# Patient Record
Sex: MALE | Race: WHITE | NOT HISPANIC OR LATINO | Employment: OTHER | URBAN - NONMETROPOLITAN AREA
[De-identification: names, ages, dates, MRNs, and addresses within clinical notes are randomized per-mention and may not be internally consistent; named-entity substitution may affect disease eponyms.]

---

## 2017-03-06 ENCOUNTER — TRANSCRIBE ORDERS (OUTPATIENT)
Dept: LAB | Facility: HOSPITAL | Age: 72
End: 2017-03-06

## 2017-03-06 DIAGNOSIS — N18.30 CHRONIC KIDNEY DISEASE, STAGE III (MODERATE) (HCC): Primary | ICD-10-CM

## 2017-03-10 ENCOUNTER — OFFICE VISIT (OUTPATIENT)
Dept: CARDIOLOGY | Facility: CLINIC | Age: 72
End: 2017-03-10

## 2017-03-10 VITALS
HEIGHT: 70 IN | HEART RATE: 73 BPM | SYSTOLIC BLOOD PRESSURE: 107 MMHG | DIASTOLIC BLOOD PRESSURE: 65 MMHG | BODY MASS INDEX: 29.2 KG/M2 | WEIGHT: 204 LBS | OXYGEN SATURATION: 99 %

## 2017-03-10 DIAGNOSIS — I25.10 CORONARY ARTERY DISEASE INVOLVING NATIVE CORONARY ARTERY OF NATIVE HEART WITHOUT ANGINA PECTORIS: Primary | ICD-10-CM

## 2017-03-10 DIAGNOSIS — I10 ESSENTIAL HYPERTENSION: ICD-10-CM

## 2017-03-10 DIAGNOSIS — E78.5 HYPERLIPIDEMIA, UNSPECIFIED HYPERLIPIDEMIA TYPE: ICD-10-CM

## 2017-03-10 PROCEDURE — 99213 OFFICE O/P EST LOW 20 MIN: CPT | Performed by: INTERNAL MEDICINE

## 2017-03-10 RX ORDER — ISOSORBIDE MONONITRATE 60 MG/1
30 TABLET, EXTENDED RELEASE ORAL DAILY
Refills: 0 | COMMUNITY
Start: 2016-12-29

## 2017-03-10 NOTE — PROGRESS NOTES
Chief complaint : Coronary artery disease    History of Present Illness this is a very pleasant 71-year-old gentleman who comes today for a follow-up visit.  Patient has been complaining of chest discomfort along the anterior axillary line.  This pain is not exacerbated with physical exertion or mental stress.  He denies shortness of breath orthopnea or PND.    Subjective      Review of Systems   Constitution: Negative. Negative for decreased appetite, diaphoresis, weakness, night sweats, weight gain and weight loss.   HENT: Negative for headaches, hearing loss, nosebleeds and sore throat.    Eyes: Negative.  Negative for blurred vision and photophobia.   Cardiovascular: Positive for chest pain and dyspnea on exertion. Negative for claudication, irregular heartbeat, leg swelling, palpitations, paroxysmal nocturnal dyspnea and syncope.   Respiratory: Positive for shortness of breath. Negative for cough, hemoptysis and wheezing.    Endocrine: Negative for cold intolerance, heat intolerance, polydipsia, polyphagia and polyuria.   Hematologic/Lymphatic: Negative.    Skin: Negative for color change, dry skin, flushing, itching and rash.   Musculoskeletal: Negative.  Negative for muscle cramps, muscle weakness and myalgias.   Gastrointestinal: Negative for abdominal pain, change in bowel habit, diarrhea, hematemesis, melena, nausea and vomiting.   Genitourinary: Negative for dysuria, frequency and hematuria.   Neurological: Negative for dizziness, focal weakness, light-headedness, loss of balance, numbness and seizures.   Psychiatric/Behavioral: Negative.  Negative for substance abuse, suicidal ideas and thoughts of violence.   Allergic/Immunologic: Negative.        Past Medical History   Diagnosis Date   • Abnormal results of cardiovascular function studies    • Anemia      Anemia - History of   • Benign prostatic hyperplasia    • Chest pain    • Chronic kidney disease, stage 3      Chronic kidney disease stage 3   •  Coronary arteriosclerosis    • Dyslipidemia    • Edema      Edema - Chronic, in feet   • Erectile dysfunction      Erectile dysfunction - History of, SP penile implant in 1994, effective   • Essential hypertension    • Gastroesophageal reflux disease    • History of echocardiogram 08/04/2009     Echocardiogram W/O color flow 70815 (1) - LV, normal size. EF 55-60%. Left atrium, right atrium & right ventricle, normal. Cardiac valves unremarkable. Intracardiac thrombus or pericardial effusion no present. Essentially unremarkable echo for his age.   • History of myocardial infarction 2008   • Hyperlipidemia    • Pain in upper limb      Pain in upper limb - History of right shoulder and arm pain, aching   • Plantar fasciitis    • Type 2 diabetes mellitus with diabetic neuropathy        Family History   Problem Relation Age of Onset   • Cancer Other    • Diabetes Other    • Heart disease Other    • Hypertension Other    • Stroke Other        Codeine     reports that he has never smoked. He does not have any smokeless tobacco history on file. He reports that he drinks alcohol. He reports that he does not use illicit drugs.    Objective     Vital Signs  Heart Rate:  [73] 73  BP: (107)/(65) 107/65    Physical Exam   Constitutional: He is oriented to person, place, and time. He appears well-developed and well-nourished.   HENT:   Head: Normocephalic and atraumatic.   Eyes: Conjunctivae and EOM are normal. Pupils are equal, round, and reactive to light.   Neck: Neck supple. No JVD present. Carotid bruit is not present. No tracheal deviation and no edema present.   Cardiovascular: Normal rate, regular rhythm, S1 normal, S2 normal, normal heart sounds and intact distal pulses.  Exam reveals no gallop, no S3, no S4 and no friction rub.    No murmur heard.  Pulmonary/Chest: Effort normal and breath sounds normal. He has no wheezes. He has no rales. He exhibits no tenderness.   Abdominal: Bowel sounds are normal. He exhibits no  abdominal bruit and no pulsatile midline mass. There is no rebound and no guarding.   Musculoskeletal: Normal range of motion. He exhibits no edema.   Neurological: He is alert and oriented to person, place, and time.   Skin: Skin is warm and dry.   Psychiatric: He has a normal mood and affect.       Procedures    Assessment/Plan     Patient Active Problem List   Diagnosis   • Hyperlipidemia   • Essential hypertension   • Coronary artery disease involving native coronary artery of native heart without angina pectoris     1. Coronary artery disease involving native coronary artery of native heart without angina pectoris  July 8, 2016 patient had cardiac catheterization which revealed patent stent to the left circumflex, patent LIMA to the LAD, patent SVG graft to the PDA  March 2009 patient underwent coronary artery bypass graft surgery with a LIMA to the LAD, SVG to D1, SVG to OM 2, SVG to RCA  August 2010 3.0 x 18, 2.5 x 28 mm, 2.25 x 12 mm xience V stent was placed to the mid and distal left circumflex.  Plan:  · We'll continue with guideline direct medical therapy and risk factor modification.    2. Essential hypertension  Patient's blood pressure is well-controlled.    Plan:  · We'll continue with current medications.    3. Hyperlipidemia, unspecified hyperlipidemia type  Patient currently is on simvastatin.  Plan:  · We'll obtain a fasting lipid panel and liver function test.    I discussed the patients findings and my recommendations with patient.    Lobito Smallwood MD  03/10/17  9:40 AM    EMR Dragon/Transcription disclaimer:   Much of this encounter note is an electronic transcription/translation of spoken language to printed text. The electronic translation of spoken language may permit erroneous, or at times, nonsensical words or phrases to be inadvertently transcribed; Although I have reviewed the note for such errors, some may still exist.

## 2017-12-12 ENCOUNTER — TRANSCRIBE ORDERS (OUTPATIENT)
Dept: LAB | Facility: HOSPITAL | Age: 72
End: 2017-12-12

## 2017-12-12 DIAGNOSIS — N18.30 CHRONIC RENAL DISEASE, STAGE III (HCC): Primary | ICD-10-CM

## 2017-12-15 ENCOUNTER — LAB (OUTPATIENT)
Dept: LAB | Facility: HOSPITAL | Age: 72
End: 2017-12-15

## 2017-12-15 DIAGNOSIS — N18.30 CHRONIC RENAL DISEASE, STAGE III (HCC): ICD-10-CM

## 2017-12-15 LAB
ALBUMIN SERPL-MCNC: 3.9 G/DL (ref 3.4–4.8)
ANION GAP SERPL CALCULATED.3IONS-SCNC: 15 MMOL/L (ref 5–15)
BUN BLD-MCNC: 20 MG/DL (ref 7–21)
BUN/CREAT SERPL: 11.8 (ref 7–25)
CALCIUM SPEC-SCNC: 9.4 MG/DL (ref 8.4–10.2)
CHLORIDE SERPL-SCNC: 105 MMOL/L (ref 95–110)
CO2 SERPL-SCNC: 22 MMOL/L (ref 22–31)
CREAT BLD-MCNC: 1.69 MG/DL (ref 0.7–1.3)
CREAT UR-MCNC: 48.4 MG/DL
GFR SERPL CREATININE-BSD FRML MDRD: 40 ML/MIN/1.73 (ref 60–98)
GLUCOSE BLD-MCNC: 68 MG/DL (ref 60–100)
PHOSPHATE SERPL-MCNC: 3.4 MG/DL (ref 2.4–4.4)
POTASSIUM BLD-SCNC: 3.9 MMOL/L (ref 3.5–5.1)
PROT UR-MCNC: 12.7 MG/DL
PROT/CREAT UR: 262.4 MG/G CREA (ref 0–200)
SODIUM BLD-SCNC: 142 MMOL/L (ref 137–145)

## 2017-12-15 PROCEDURE — 84156 ASSAY OF PROTEIN URINE: CPT

## 2017-12-15 PROCEDURE — 80069 RENAL FUNCTION PANEL: CPT

## 2017-12-15 PROCEDURE — 36415 COLL VENOUS BLD VENIPUNCTURE: CPT

## 2017-12-15 PROCEDURE — 82570 ASSAY OF URINE CREATININE: CPT

## 2018-03-27 ENCOUNTER — OFFICE VISIT (OUTPATIENT)
Dept: CARDIOLOGY | Facility: CLINIC | Age: 73
End: 2018-03-27

## 2018-03-27 VITALS
WEIGHT: 204 LBS | OXYGEN SATURATION: 98 % | HEART RATE: 74 BPM | HEIGHT: 69 IN | BODY MASS INDEX: 30.21 KG/M2 | SYSTOLIC BLOOD PRESSURE: 118 MMHG | DIASTOLIC BLOOD PRESSURE: 68 MMHG

## 2018-03-27 DIAGNOSIS — I10 ESSENTIAL HYPERTENSION: Primary | ICD-10-CM

## 2018-03-27 DIAGNOSIS — I11.0 HYPERTENSIVE HEART DISEASE WITH HEART FAILURE (HCC): ICD-10-CM

## 2018-03-27 DIAGNOSIS — N18.4 STAGE 4 CHRONIC KIDNEY DISEASE (HCC): ICD-10-CM

## 2018-03-27 DIAGNOSIS — E78.00 PURE HYPERCHOLESTEROLEMIA: ICD-10-CM

## 2018-03-27 DIAGNOSIS — Z79.4 TYPE 2 DIABETES MELLITUS WITHOUT COMPLICATION, WITH LONG-TERM CURRENT USE OF INSULIN (HCC): ICD-10-CM

## 2018-03-27 DIAGNOSIS — E11.9 TYPE 2 DIABETES MELLITUS WITHOUT COMPLICATION, WITH LONG-TERM CURRENT USE OF INSULIN (HCC): ICD-10-CM

## 2018-03-27 DIAGNOSIS — I25.708 CORONARY ARTERY DISEASE OF BYPASS GRAFT OF NATIVE HEART WITH STABLE ANGINA PECTORIS (HCC): ICD-10-CM

## 2018-03-27 PROBLEM — I25.810 CORONARY ARTERY DISEASE INVOLVING CORONARY BYPASS GRAFT OF NATIVE HEART WITHOUT ANGINA PECTORIS: Status: ACTIVE | Noted: 2018-03-27

## 2018-03-27 PROCEDURE — 99214 OFFICE O/P EST MOD 30 MIN: CPT | Performed by: INTERNAL MEDICINE

## 2018-03-27 RX ORDER — DOXAZOSIN MESYLATE 4 MG/1
4 TABLET ORAL
Refills: 0 | COMMUNITY
Start: 2018-01-02

## 2018-03-27 NOTE — PROGRESS NOTES
[facility] Cardiology  OFFICE NOTE    Thaddeus William  72 y.o. male    03/27/2018  1. Essential hypertension    2. Type 2 diabetes mellitus without complication, with long-term current use of insulin    3. Pure hypercholesterolemia    4. Coronary artery disease of bypass graft of native heart with stable angina pectoris    5. Hypertensive heart disease with heart failure     6. Stage 4 chronic kidney disease        Chief complaint -Follow-up CAD      History of present Illness- 71-year-old gentleman who is history of coronary disease status post CABG in 2008 with LIMA to LAD, SVG to the PDA and had stent placement to left circumflex artery had a cardiac catheterization June 2016 showed the grafts patent and the native circumflex stents patent.  He has chronic stable angina on multiple medicines and he has chronic kidney disease stage IV.  [facility]  He is currently not on any antiplatelet therapy because of her prior bleeding into the eye.  I asked him to discuss with ophthalmologist about putting him on antiplatelet therapy either Plavix or aspirin and his wife is going to call me back with the results.  He he does have angina occasionally and going to start him on Ranexa 500 twice a day .  He had retinopathy with bleeding July.  He never had any stroke.  He does have peripheral neuropathy.              Allergies   Allergen Reactions   • Codeine Hives and Itching         Past Medical History:   Diagnosis Date   • Abnormal results of cardiovascular function studies    • Anemia     Anemia - History of   • Benign prostatic hyperplasia    • Chest pain    • Chronic kidney disease, stage 3     Chronic kidney disease stage 3   • Coronary arteriosclerosis    • Dyslipidemia    • Edema     Edema - Chronic, in feet   • Erectile dysfunction     Erectile dysfunction - History of, SP penile implant in 1994, effective   • Essential hypertension    • Gastroesophageal reflux disease    •  History of echocardiogram 08/04/2009    Echocardiogram W/O color flow 19456 (1) - LV, normal size. EF 55-60%. Left atrium, right atrium & right ventricle, normal. Cardiac valves unremarkable. Intracardiac thrombus or pericardial effusion no present. Essentially unremarkable echo for his age.   • History of myocardial infarction 2008   • Hyperlipidemia    • Pain in upper limb     Pain in upper limb - History of right shoulder and arm pain, aching   • Plantar fasciitis    • Type 2 diabetes mellitus with diabetic neuropathy          Past Surgical History:   Procedure Laterality Date   • CARDIAC CATHETERIZATION  06/08/2016    Cardiac cath 46488 (3) - Multivessel CAD including total occlusion of the LAD and total occlusion of the RCA.Patent stents in the left circumflex.Patent LIMA graft to the LAD.Patent SVG graft to the PDA.   • CORONARY ARTERY BYPASS GRAFT  03/11/2009    CABG (1) - CABG X 4. Placement of left int. mammary to LAD coronary artery, placement of saphenous vein graft fr. asc. aorta to 1st diag. br. pl. of saphenous vein graft fr. asc aorta to sec. joe. br. pl. of saphenous vein graft fr. asc. aorta to distal RCA    • PENILE PROSTHESIS IMPLANT  1994    Anesth, insert penis device (1) - Penile implant with use pump placed by Dr Onofre in [place], [place]         Family History   Problem Relation Age of Onset   • Cancer Other    • Diabetes Other    • Heart disease Other    • Hypertension Other    • Stroke Other          Social History     Social History   • Marital status:      Spouse name: N/A   • Number of children: N/A   • Years of education: N/A     Occupational History   • Not on file.     Social History Main Topics   • Smoking status: Never Smoker   • Smokeless tobacco: Never Used      Comment: Tobacco no use   • Alcohol use Yes      Comment: social   • Drug use: No   • Sexual activity: Defer      Comment: He is , and a retired State      Other Topics Concern   • Not on  file     Social History Narrative   • No narrative on file         Current Outpatient Prescriptions   Medication Sig Dispense Refill   • acetaZOLAMIDE (DIAMOX) 250 MG tablet Take 250 mg by mouth 2 (Two) Times a Day. Indication: eye     • atenolol (TENORMIN) 50 MG tablet Take 50 mg by mouth Daily. Indication: heart     • Cholecalciferol (VITAMIN D PO) Take 2 tablets by mouth Daily. Indication: supplement     • cyanocobalamin (VITAMIN B-12) 1000 MCG tablet Take 1,000 mcg by mouth Daily. Indication: supplement     • doxazosin (CARDURA) 4 MG tablet Take 4 mg by mouth every night at bedtime.  0   • furosemide (LASIX) 20 MG tablet Take 20 mg by mouth Every Other Day. Indication: fluid     • insulin glargine (LANTUS) 100 UNIT/ML injection Inject 45 Units under the skin Daily. (insulin); Med Name: Lantus 100 unit/mL subcutaneous solution; Indication: DIABETES     • insulin lispro (HUMALOG) 100 UNIT/ML injection Inject 20 Units under the skin 3 (Three) Times a Day As Needed. (insulin); Med Name: Humalog 100 unit/mL subcutaneous solution; Indication: DIABETES     • isosorbide mononitrate (IMDUR) 60 MG 24 hr tablet Take 60 mg by mouth Daily. 1/2 tablet daily  0   • lisinopril (PRINIVIL,ZESTRIL) 20 MG tablet Take 20 mg by mouth Daily. Indication: bp     • niacin (NIACIN SR,NIASPAN ER) 500 MG CR capsule Take 500 mg by mouth Daily. Med Name: niacin  mg capsule,extended release; Indication: lipids     • nitroglycerin (NITROSTAT) 0.4 MG SL tablet Place 0.4 mg under the tongue As Needed for chest pain. as directed     • simvastatin (ZOCOR) 40 MG tablet Take 40 mg by mouth Daily. Indication: cholesterol     • vitamin C (ASCORBIC ACID) 500 MG tablet Take 500 mg by mouth 2 (Two) Times a Day. Indication: supplement       No current facility-administered medications for this visit.          Review of Systems     Constitution: Denies any fatigue, fever or chills    HENT: Mild hearing impairment,     Eyes: History of retinopathy  "needing laser in the eye due to bleeding     Cardivascular - As per history of present illness     Respiratory system-NYHA class II dyspnea       Endocrine:   history of hyperlipidemia,                      Hypothyrodism       Musculoskeletal:   history of arthritis with musculoskeletal problems    Gastrointestinal: No nausea, vomiting, or melena    Genitourinary: No dysuria or hematuria    Neurological:   No history of seizure disorder, stroke, memory problems    Psychiatric/Behavioral:        No history of depression    Hematological- no history of easy bruising             OBJECTIVE    /68 (BP Location: Left arm, Patient Position: Sitting, Cuff Size: Adult)   Pulse 74   Ht 175.3 cm (69\")   Wt 92.5 kg (204 lb)   SpO2 98%   BMI 30.13 kg/m²       Physical Exam     Constitutional: is oriented to person, place, and time.     Skin-warm and dry    Well developed and nourished in no acute distress      Head: Normocephalic and atraumatic.     Eyes: Pupils are equal    Neck: Neck supple. No bruit in the carotids    Cardiovascular: [place] in the fifth intercostal space   Regular rate, and  Rhythm,    S1 greater than S2, 2/6 systolic murmur at the apex    Pulmonary/Chest:   Air  Entry is equal on both sides  No wheezing or crackles,      Abdominal: Soft.  No hepatosplenomegaly, bowel sounds are present    Musculoskeletal: No kyphoscoliosis, no significant thickening of the joints    Neurological: is alert and oriented to person, place, and time.    cranial nerve are intact .   No motor or sensory deficit    Extremities-no edema, no radial femoral delay      Psychiatric: He has a normal mood and affect.                  His behavior is normal.           Procedures      Lab Results   Component Value Date    WBC 6.9 06/08/2016    HGB 11.9 (L) 06/08/2016    HCT 34.4 (L) 06/08/2016    MCV 91.5 06/08/2016     (L) 06/08/2016     Lab Results   Component Value Date    GLUCOSE 68 12/15/2017    BUN 20 12/15/2017    " CREATININE 1.69 (H) 12/15/2017    EGFRIFNONA 40 (L) 12/15/2017    BCR 11.8 12/15/2017    CO2 22.0 12/15/2017    CALCIUM 9.4 12/15/2017    ALBUMIN 3.90 12/15/2017    ALT 23 05/19/2014                  A/P    Chronic stable angina with history of CABG, last cardiac catheterization June 2016 grafts patent and stent to the circumflex patent, will get an echo to assess LV function and valvular function as he has shortness of breath and chronic stable angina.  He has been on nitrates and beta blockers and Ace inhibitors.  We'll check with ophthalmology to see if he can be  on antiplatelet therapy.    Hyperlipidemia on niacin and simvastatin.    Diabetes on insulin.    Chronic kidney disease stage IV-will avoid all types of IV contrast will refer him to nephrology at some point.    BPH on Cardura.    Hypertension-on atenolol, lisinopril, Imdur.    Follow-up in 6 months            This document has been electronically signed by Ze Vines MD on March 27, 2018 4:29 PM       EMR Dragon/Transcription disclaimer:   Some of this note may be an electronic transcription/translation of spoken language to printed text. The electronic translation of spoken language may permit erroneous, or at times, nonsensical words or phrases to be inadvertently transcribed; Although I have reviewed the note for such errors, some may still exist.

## 2018-03-28 ENCOUNTER — DOCUMENTATION (OUTPATIENT)
Dept: CARDIOLOGY | Facility: CLINIC | Age: 73
End: 2018-03-28

## 2018-03-28 NOTE — PROGRESS NOTES
Pt called back, his ophthalmologist does not want him to resume blood thinners, due to potential eye bleed, Dr Vines notified

## 2018-09-19 LAB
BH CV ECHO MEAS - ACS: 1.7 CM
BH CV ECHO MEAS - AO MAX PG (FULL): 3.1 MMHG
BH CV ECHO MEAS - AO MAX PG: 7.5 MMHG
BH CV ECHO MEAS - AO MEAN PG (FULL): 3 MMHG
BH CV ECHO MEAS - AO MEAN PG: 5 MMHG
BH CV ECHO MEAS - AO ROOT AREA (BSA CORRECTED): 1.4
BH CV ECHO MEAS - AO ROOT AREA: 6.6 CM^2
BH CV ECHO MEAS - AO ROOT DIAM: 2.9 CM
BH CV ECHO MEAS - AO V2 MAX: 137 CM/SEC
BH CV ECHO MEAS - AO V2 MEAN: 102 CM/SEC
BH CV ECHO MEAS - AO V2 VTI: 33.1 CM
BH CV ECHO MEAS - ASC AORTA: 3.1 CM
BH CV ECHO MEAS - AVA(I,A): 3.4 CM^2
BH CV ECHO MEAS - AVA(I,D): 3.4 CM^2
BH CV ECHO MEAS - AVA(V,A): 3.2 CM^2
BH CV ECHO MEAS - AVA(V,D): 3.2 CM^2
BH CV ECHO MEAS - BSA(HAYCOCK): 2.1 M^2
BH CV ECHO MEAS - BSA: 2.1 M^2
BH CV ECHO MEAS - BZI_BMI: 30.1 KILOGRAMS/M^2
BH CV ECHO MEAS - BZI_METRIC_HEIGHT: 175.3 CM
BH CV ECHO MEAS - BZI_METRIC_WEIGHT: 92.5 KG
BH CV ECHO MEAS - EDV(CUBED): 97.3 ML
BH CV ECHO MEAS - EDV(TEICH): 97.3 ML
BH CV ECHO MEAS - EF(CUBED): 72.3 %
BH CV ECHO MEAS - EF(TEICH): 64 %
BH CV ECHO MEAS - ESV(CUBED): 27 ML
BH CV ECHO MEAS - ESV(TEICH): 35 ML
BH CV ECHO MEAS - FS: 34.8 %
BH CV ECHO MEAS - IVS/LVPW: 0.91
BH CV ECHO MEAS - IVSD: 1 CM
BH CV ECHO MEAS - LA DIMENSION: 4.5 CM
BH CV ECHO MEAS - LA/AO: 1.6
BH CV ECHO MEAS - LV MASS(C)D: 169.9 GRAMS
BH CV ECHO MEAS - LV MASS(C)DI: 81.5 GRAMS/M^2
BH CV ECHO MEAS - LV MAX PG: 4.4 MMHG
BH CV ECHO MEAS - LV MEAN PG: 2 MMHG
BH CV ECHO MEAS - LV V1 MAX: 105 CM/SEC
BH CV ECHO MEAS - LV V1 MEAN: 71.8 CM/SEC
BH CV ECHO MEAS - LV V1 VTI: 27 CM
BH CV ECHO MEAS - LVIDD: 4.6 CM
BH CV ECHO MEAS - LVIDS: 3 CM
BH CV ECHO MEAS - LVOT AREA (M): 4.2 CM^2
BH CV ECHO MEAS - LVOT AREA: 4.2 CM^2
BH CV ECHO MEAS - LVOT DIAM: 2.3 CM
BH CV ECHO MEAS - LVPWD: 1.1 CM
BH CV ECHO MEAS - MR MAX PG: 85.7 MMHG
BH CV ECHO MEAS - MR MAX VEL: 463 CM/SEC
BH CV ECHO MEAS - MV A MAX VEL: 49 CM/SEC
BH CV ECHO MEAS - MV DEC SLOPE: 447 CM/SEC^2
BH CV ECHO MEAS - MV E MAX VEL: 87.4 CM/SEC
BH CV ECHO MEAS - MV E/A: 1.8
BH CV ECHO MEAS - MV P1/2T MAX VEL: 96.9 CM/SEC
BH CV ECHO MEAS - MV P1/2T: 63.5 MSEC
BH CV ECHO MEAS - MVA P1/2T LCG: 2.3 CM^2
BH CV ECHO MEAS - MVA(P1/2T): 3.5 CM^2
BH CV ECHO MEAS - PA MAX PG: 2.7 MMHG
BH CV ECHO MEAS - PA V2 MAX: 81.4 CM/SEC
BH CV ECHO MEAS - RAP SYSTOLE: 5 MMHG
BH CV ECHO MEAS - RVDD: 3.4 CM
BH CV ECHO MEAS - RVSP: 34.4 MMHG
BH CV ECHO MEAS - SI(AO): 105 ML/M^2
BH CV ECHO MEAS - SI(CUBED): 33.8 ML/M^2
BH CV ECHO MEAS - SI(LVOT): 53.9 ML/M^2
BH CV ECHO MEAS - SI(TEICH): 29.9 ML/M^2
BH CV ECHO MEAS - SV(AO): 218.6 ML
BH CV ECHO MEAS - SV(CUBED): 70.3 ML
BH CV ECHO MEAS - SV(LVOT): 112.2 ML
BH CV ECHO MEAS - SV(TEICH): 62.3 ML
BH CV ECHO MEAS - TR MAX VEL: 271 CM/SEC
LV EF 2D ECHO EST: 57 %
MAXIMAL PREDICTED HEART RATE: 146 BPM
STRESS TARGET HR: 124 BPM

## 2018-09-20 ENCOUNTER — DOCUMENTATION (OUTPATIENT)
Dept: CARDIOLOGY | Facility: CLINIC | Age: 73
End: 2018-09-20

## 2018-09-27 ENCOUNTER — OFFICE VISIT (OUTPATIENT)
Dept: CARDIOLOGY | Facility: CLINIC | Age: 73
End: 2018-09-27

## 2018-09-27 VITALS
HEIGHT: 69 IN | HEART RATE: 69 BPM | WEIGHT: 204 LBS | DIASTOLIC BLOOD PRESSURE: 70 MMHG | BODY MASS INDEX: 30.21 KG/M2 | SYSTOLIC BLOOD PRESSURE: 123 MMHG

## 2018-09-27 DIAGNOSIS — I10 ESSENTIAL HYPERTENSION: ICD-10-CM

## 2018-09-27 DIAGNOSIS — N18.30 STAGE 3 CHRONIC KIDNEY DISEASE (HCC): ICD-10-CM

## 2018-09-27 DIAGNOSIS — E78.00 PURE HYPERCHOLESTEROLEMIA: ICD-10-CM

## 2018-09-27 DIAGNOSIS — I25.708 CORONARY ARTERY DISEASE OF BYPASS GRAFT OF NATIVE HEART WITH STABLE ANGINA PECTORIS (HCC): Primary | ICD-10-CM

## 2018-09-27 PROCEDURE — 99214 OFFICE O/P EST MOD 30 MIN: CPT | Performed by: INTERNAL MEDICINE

## 2018-09-27 NOTE — PROGRESS NOTES
[facility] Cardiology  OFFICE NOTE    Thaddeus William  73 y.o. male    09/27/2018  1. Coronary artery disease of bypass graft of native heart with stable angina pectoris (CMS/[facility])    2. Pure hypercholesterolemia    3. Essential hypertension    4. Stage 3 chronic kidney disease        Chief complaint -Follow-up CAD      History of present Illness- 73-year-old gentleman who is history of coronary disease status post CABG in 2008 with LIMA to LAD, SVG to the PDA and had stent placement to left circumflex artery had a cardiac catheterization June 2016 showed the grafts patent and the native circumflex stents patent.  He has chronic stable angina on multiple medicines and he has chronic kidney disease stage III.  [facility]  He is currently not on any antiplatelet therapy because of her prior bleeding into the eye.  Since he had bleeding into both eyes and right eye bad is legally blind and he can see only in bright light with the left eye, the ophthalmologist advised him to be off of antiplatelet therapy.  He does have chronic stable angina is doing well with the current medications.          Allergies   Allergen Reactions   • Codeine Hives and Itching         Past Medical History:   Diagnosis Date   • Abnormal results of cardiovascular function studies    • Anemia     Anemia - History of   • Benign prostatic hyperplasia    • Chest pain    • Chronic kidney disease, stage 3     Chronic kidney disease stage 3   • Coronary arteriosclerosis    • Dyslipidemia    • Edema     Edema - Chronic, in feet   • Erectile dysfunction     Erectile dysfunction - History of, SP penile implant in 1994, effective   • Essential hypertension    • Gastroesophageal reflux disease    • History of echocardiogram 08/04/2009    Echocardiogram W/O color flow 94303 (1) - LV, normal size. EF 55-60%. Left atrium, right atrium & right ventricle, normal. Cardiac valves unremarkable. Intracardiac thrombus or  pericardial effusion no present. Essentially unremarkable echo for his age.   • History of myocardial infarction 2008   • Hyperlipidemia    • Pain in upper limb     Pain in upper limb - History of right shoulder and arm pain, aching   • Plantar fasciitis    • Type 2 diabetes mellitus with diabetic neuropathy (CMS/[facility])          Past Surgical History:   Procedure Laterality Date   • CARDIAC CATHETERIZATION  06/08/2016    Cardiac cath 60277 (3) - Multivessel CAD including total occlusion of the LAD and total occlusion of the RCA.Patent stents in the left circumflex.Patent LIMA graft to the LAD.Patent SVG graft to the PDA.   • CORONARY ARTERY BYPASS GRAFT  03/11/2009    CABG (1) - CABG X 4. Placement of left int. mammary to LAD coronary artery, placement of saphenous vein graft fr. asc. aorta to 1st diag. br. pl. of saphenous vein graft fr. asc aorta to sec. joe. br. pl. of saphenous vein graft fr. asc. aorta to distal RCA    • PENILE PROSTHESIS IMPLANT  1994    Anesth, insert penis device (1) - Penile implant with use pump placed by Dr Onofre in [place], [place]         Family History   Problem Relation Age of Onset   • Cancer Other    • Diabetes Other    • Heart disease Other    • Hypertension Other    • Stroke Other          Social History     Social History   • Marital status:      Spouse name: N/A   • Number of children: N/A   • Years of education: N/A     Occupational History   • Not on file.     Social History Main Topics   • Smoking status: Never Smoker   • Smokeless tobacco: Never Used      Comment: Tobacco no use   • Alcohol use Yes      Comment: social   • Drug use: No   • Sexual activity: Defer      Comment: He is , and a retired [employer]      Other Topics Concern   • Not on file     Social History Narrative   • No narrative on file         Current Outpatient Prescriptions   Medication Sig Dispense Refill   • acetaZOLAMIDE (DIAMOX) 250 MG tablet Take 250 mg by mouth 2 (Two)  Times a Day. Indication: eye     • atenolol (TENORMIN) 50 MG tablet Take 50 mg by mouth Daily. Indication: heart     • Cholecalciferol (VITAMIN D PO) Take 2 tablets by mouth Daily. Indication: supplement     • cyanocobalamin (VITAMIN B-12) 1000 MCG tablet Take 1,000 mcg by mouth Daily. Indication: supplement     • doxazosin (CARDURA) 4 MG tablet Take 4 mg by mouth every night at bedtime.  0   • furosemide (LASIX) 20 MG tablet Take 20 mg by mouth Every Other Day. Indication: fluid     • insulin glargine (LANTUS) 100 UNIT/ML injection Inject 45 Units under the skin Daily. (insulin); Med Name: Lantus 100 unit/mL subcutaneous solution; Indication: DIABETES     • insulin lispro (HUMALOG) 100 UNIT/ML injection Inject 20 Units under the skin 3 (Three) Times a Day As Needed. (insulin); Med Name: Humalog 100 unit/mL subcutaneous solution; Indication: DIABETES     • isosorbide mononitrate (IMDUR) 60 MG 24 hr tablet Take 60 mg by mouth Daily. 1/2 tablet daily  0   • lisinopril (PRINIVIL,ZESTRIL) 20 MG tablet Take 20 mg by mouth Daily. Indication: bp     • niacin (NIACIN SR,NIASPAN ER) 500 MG CR capsule Take 500 mg by mouth Daily. Med Name: niacin  mg capsule,extended release; Indication: lipids     • nitroglycerin (NITROSTAT) 0.4 MG SL tablet Place 0.4 mg under the tongue As Needed for chest pain. as directed     • simvastatin (ZOCOR) 40 MG tablet Take 40 mg by mouth Daily. Indication: cholesterol     • vitamin C (ASCORBIC ACID) 500 MG tablet Take 500 mg by mouth 2 (Two) Times a Day. Indication: supplement       No current facility-administered medications for this visit.          Review of Systems     Constitution: Denies any fatigue, fever or chills    HENT: Mild hearing impairment,     Eyes: History of retinopathy needing laser in the eye due to bleeding     Cardivascular - As per history of present illness     Respiratory system-NYHA class II dyspnea       Endocrine:   history of hyperlipidemia,                       "Hypothyrodism       Musculoskeletal:   history of arthritis with musculoskeletal problems    Gastrointestinal: No nausea, vomiting, or melena    Genitourinary: Has BPH and is chronic kidney disease stage III    Neurological:   No history of seizure disorder, stroke, memory problems    Psychiatric/Behavioral:        No history of depression    Hematological- no history of easy bruising             OBJECTIVE    /70   Pulse 69   Ht 175.3 cm (69.02\")   Wt 92.5 kg (204 lb)   BMI 30.11 kg/m²       Physical Exam     Constitutional: is oriented to person, place, and time.     Skin-warm and dry    Well developed and nourished in no acute distress      Head: Normocephalic and atraumatic.     Eyes: Right eye is legally blind    Neck: Neck supple. No bruit in the carotids    Cardiovascular: [place] in the fifth intercostal space   Regular rate, and  Rhythm,    S1 greater than S2, 2/6 systolic murmur at the apex    Pulmonary/Chest:   Air  Entry is equal on both sides  No wheezing or crackles,      Abdominal: Soft.  No hepatosplenomegaly, bowel sounds are present    Musculoskeletal: No kyphoscoliosis, no significant thickening of the joints    Neurological: is alert and oriented to person, place, and time.    cranial nerve are intact .   No motor or sensory deficit    Extremities-no edema, no radial femoral delay      Psychiatric: He has a normal mood and affect.                  His behavior is normal.           Procedures      Lab Results   Component Value Date    WBC 6.9 06/08/2016    HGB 11.9 (L) 06/08/2016    HCT 34.4 (L) 06/08/2016    MCV 91.5 06/08/2016     (L) 06/08/2016     Lab Results   Component Value Date    GLUCOSE 68 12/15/2017    BUN 20 12/15/2017    CREATININE 1.69 (H) 12/15/2017    EGFRIFNONA 40 (L) 12/15/2017    BCR 11.8 12/15/2017    CO2 22.0 12/15/2017    CALCIUM 9.4 12/15/2017    ALBUMIN 3.90 12/15/2017    ALT 23 05/19/2014                  A/P    Chronic stable angina with history of CABG, last " cardiac catheterization June 2016 grafts patent and stent to the circumflex patent,  Has chronic stable angina.  He has been on nitrates and beta blockers and Ace inhibitors.  Cannot be on any antiplatelet therapy due to prior history of bleeding into both eyes and his right eye is legally blind    Hyperlipidemia on niacin and simvastatin.    Diabetes on insulin.    Chronic kidney disease stage -3 table and being followed with Dr. Dionicio NAVA on Cardura.    Hypertension-on atenolol, lisinopril, Imdur.    Follow-up in 8 months            This document has been electronically signed by Ze Vines MD on September 27, 2018 10:34 AM       EMR Dragon/Transcription disclaimer:   Some of this note may be an electronic transcription/translation of spoken language to printed text. The electronic translation of spoken language may permit erroneous, or at times, nonsensical words or phrases to be inadvertently transcribed; Although I have reviewed the note for such errors, some may still exist.

## 2018-12-14 ENCOUNTER — APPOINTMENT (OUTPATIENT)
Dept: LAB | Facility: HOSPITAL | Age: 73
End: 2018-12-14

## 2018-12-14 ENCOUNTER — TRANSCRIBE ORDERS (OUTPATIENT)
Dept: LAB | Facility: HOSPITAL | Age: 73
End: 2018-12-14

## 2018-12-14 DIAGNOSIS — N18.30 CHRONIC KIDNEY DISEASE, STAGE III (MODERATE) (HCC): Primary | ICD-10-CM

## 2018-12-14 LAB
25(OH)D3 SERPL-MCNC: 76.3 NG/ML (ref 30–100)
ALBUMIN SERPL-MCNC: 3.7 G/DL (ref 3.4–4.8)
ANION GAP SERPL CALCULATED.3IONS-SCNC: 11 MMOL/L (ref 5–15)
BUN BLD-MCNC: 19 MG/DL (ref 7–21)
BUN/CREAT SERPL: 12.8 (ref 7–25)
CALCIUM SPEC-SCNC: 8.9 MG/DL (ref 8.4–10.2)
CHLORIDE SERPL-SCNC: 109 MMOL/L (ref 95–110)
CO2 SERPL-SCNC: 22 MMOL/L (ref 22–31)
CREAT BLD-MCNC: 1.48 MG/DL (ref 0.7–1.3)
CREAT UR-MCNC: 103.5 MG/DL
GFR SERPL CREATININE-BSD FRML MDRD: 46 ML/MIN/1.73 (ref 42–98)
GLUCOSE BLD-MCNC: 75 MG/DL (ref 60–100)
PHOSPHATE SERPL-MCNC: 3.3 MG/DL (ref 2.4–4.4)
POTASSIUM BLD-SCNC: 3.8 MMOL/L (ref 3.5–5.1)
PROT UR-MCNC: 13.4 MG/DL
PROT/CREAT UR: 129.5 MG/G CREA (ref 0–200)
SODIUM BLD-SCNC: 142 MMOL/L (ref 137–145)

## 2018-12-14 PROCEDURE — 80069 RENAL FUNCTION PANEL: CPT | Performed by: INTERNAL MEDICINE

## 2018-12-14 PROCEDURE — 82570 ASSAY OF URINE CREATININE: CPT | Performed by: INTERNAL MEDICINE

## 2018-12-14 PROCEDURE — 84156 ASSAY OF PROTEIN URINE: CPT | Performed by: INTERNAL MEDICINE

## 2018-12-14 PROCEDURE — 82306 VITAMIN D 25 HYDROXY: CPT | Performed by: INTERNAL MEDICINE

## 2018-12-14 PROCEDURE — 36415 COLL VENOUS BLD VENIPUNCTURE: CPT | Performed by: INTERNAL MEDICINE

## 2019-05-29 DIAGNOSIS — I25.10 CORONARY ARTERY DISEASE INVOLVING NATIVE CORONARY ARTERY OF NATIVE HEART WITHOUT ANGINA PECTORIS: Primary | ICD-10-CM

## 2019-05-30 ENCOUNTER — OFFICE VISIT (OUTPATIENT)
Dept: CARDIOLOGY | Facility: CLINIC | Age: 74
End: 2019-05-30

## 2019-05-30 VITALS
HEIGHT: 69 IN | BODY MASS INDEX: 29.62 KG/M2 | WEIGHT: 200 LBS | HEART RATE: 59 BPM | DIASTOLIC BLOOD PRESSURE: 70 MMHG | SYSTOLIC BLOOD PRESSURE: 117 MMHG

## 2019-05-30 DIAGNOSIS — E78.00 PURE HYPERCHOLESTEROLEMIA: ICD-10-CM

## 2019-05-30 DIAGNOSIS — N18.30 STAGE 3 CHRONIC KIDNEY DISEASE (HCC): ICD-10-CM

## 2019-05-30 DIAGNOSIS — I10 ESSENTIAL HYPERTENSION: ICD-10-CM

## 2019-05-30 DIAGNOSIS — I25.10 CORONARY ARTERY DISEASE INVOLVING NATIVE CORONARY ARTERY OF NATIVE HEART WITHOUT ANGINA PECTORIS: Primary | ICD-10-CM

## 2019-05-30 PROCEDURE — 93000 ELECTROCARDIOGRAM COMPLETE: CPT | Performed by: INTERNAL MEDICINE

## 2019-05-30 PROCEDURE — 99214 OFFICE O/P EST MOD 30 MIN: CPT | Performed by: INTERNAL MEDICINE

## 2019-05-30 RX ORDER — RANOLAZINE 500 MG/1
500 TABLET, EXTENDED RELEASE ORAL 2 TIMES DAILY
Qty: 60 TABLET | Refills: 12 | Status: SHIPPED | OUTPATIENT
Start: 2019-05-30

## 2019-05-30 RX ORDER — ASPIRIN 81 MG/1
81 TABLET, CHEWABLE ORAL DAILY
COMMUNITY

## 2019-05-30 RX ORDER — RANOLAZINE 500 MG/1
500 TABLET, EXTENDED RELEASE ORAL 2 TIMES DAILY
Qty: 60 TABLET | Refills: 12 | Status: SHIPPED | OUTPATIENT
Start: 2019-05-30 | End: 2019-05-30 | Stop reason: SDUPTHER

## 2019-05-30 NOTE — PROGRESS NOTES
[facility] Cardiology  OFFICE NOTE    Thaddeus William  74 y.o. male    05/30/2019  1. Coronary artery disease involving native coronary artery of native heart without angina pectoris    2. Pure hypercholesterolemia    3. Essential hypertension    4. Stage 3 chronic kidney disease (CMS/HCC)        Chief complaint -Follow-up CAD      History of present Illness- 74-year-old gentleman who is history of coronary disease status post CABG in 2008 with LIMA to LAD, SVG to the PDA and had stent placement to left circumflex artery had a cardiac catheterization June 2016 showed the grafts patent and the native circumflex stents patent.  He has chronic stable angina on multiple medicines and he has chronic kidney disease stage III.  [facility]  He is currently  on aspirin 81 mg daily which he had stopped before because of  prior bleeding into the eye.  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  He does have chronic stable angina, started him on Ranexa 500 twice daily and asked him to take the aspirin every other day          Allergies   Allergen Reactions   • Codeine Hives and Itching         Past Medical History:   Diagnosis Date   • Abnormal results of cardiovascular function studies    • Anemia     Anemia - History of   • Benign prostatic hyperplasia    • Chest pain    • Chronic kidney disease, stage 3 (CMS/HCC)     Chronic kidney disease stage 3   • Coronary arteriosclerosis    • Dyslipidemia    • Edema     Edema - Chronic, in feet   • Erectile dysfunction     Erectile dysfunction - History of, SP penile implant in 1994, effective   • Essential hypertension    • Gastroesophageal reflux disease    • History of echocardiogram 08/04/2009    Echocardiogram W/O color  flow 80260 (1) - LV, normal size. EF 55-60%. Left atrium, right atrium & right ventricle, normal. Cardiac valves unremarkable. Intracardiac thrombus or pericardial effusion no present. Essentially unremarkable echo for his age.   • History of myocardial infarction 2008   • Hyperlipidemia    • Pain in upper limb     Pain in upper limb - History of right shoulder and arm pain, aching   • Plantar fasciitis    • Type 2 diabetes mellitus with diabetic neuropathy (CMS/HCC)          Past Surgical History:   Procedure Laterality Date   • CARDIAC CATHETERIZATION  06/08/2016    Cardiac cath 88913 (3) - Multivessel CAD including total occlusion of the LAD and total occlusion of the RCA.Patent stents in the left circumflex.Patent LIMA graft to the LAD.Patent SVG graft to the PDA.   • CORONARY ARTERY BYPASS GRAFT  03/11/2009    CABG (1) - CABG X 4. Placement of left int. mammary to LAD coronary artery, placement of saphenous vein graft fr. asc. aorta to 1st diag. br. pl. of saphenous vein graft fr. asc aorta to sec. joe. br. pl. of saphenous vein graft fr. asc. aorta to distal RCA    • PENILE PROSTHESIS IMPLANT  1994    Anesth, insert penis device (1) - Penile implant with use pump placed by Dr Onofre in [place], [place]         Family History   Problem Relation Age of Onset   • Cancer Other    • Diabetes Other    • Heart disease Other    • Hypertension Other    • Stroke Other          Social History     Socioeconomic History   • Marital status:      Spouse name: Not on file   • Number of children: Not on file   • Years of education: Not on file   • Highest education level: Not on file   Tobacco Use   • Smoking status: Never Smoker   • Smokeless tobacco: Never Used   • Tobacco comment: Tobacco no use   Substance and Sexual Activity   • Alcohol use: Yes     Comment: social   • Drug use: No   • Sexual activity: Defer     Comment: He is , and a retired State          Current Outpatient Medications    Medication Sig Dispense Refill   • acetaZOLAMIDE (DIAMOX) 250 MG tablet Take 250 mg by mouth 2 (Two) Times a Day. Indication: eye     • aspirin 81 MG chewable tablet Chew 81 mg Daily.     • atenolol (TENORMIN) 50 MG tablet Take 50 mg by mouth Daily. Indication: heart     • Cholecalciferol (VITAMIN D PO) Take 2 tablets by mouth Daily. Indication: supplement     • cyanocobalamin (VITAMIN B-12) 1000 MCG tablet Take 1,000 mcg by mouth Daily. Indication: supplement     • doxazosin (CARDURA) 4 MG tablet Take 4 mg by mouth every night at bedtime.  0   • furosemide (LASIX) 20 MG tablet Take 20 mg by mouth Every Other Day. Indication: fluid     • insulin glargine (LANTUS) 100 UNIT/ML injection Inject 45 Units under the skin Daily. (insulin); Med Name: Lantus 100 unit/mL subcutaneous solution; Indication: DIABETES     • insulin lispro (HUMALOG) 100 UNIT/ML injection Inject 20 Units under the skin 3 (Three) Times a Day As Needed. (insulin); Med Name: Humalog 100 unit/mL subcutaneous solution; Indication: DIABETES     • isosorbide mononitrate (IMDUR) 60 MG 24 hr tablet Take 60 mg by mouth Daily. 1/2 tablet daily  0   • lisinopril (PRINIVIL,ZESTRIL) 20 MG tablet Take 20 mg by mouth Daily. Indication: bp     • niacin (NIACIN SR,NIASPAN ER) 500 MG CR capsule Take 500 mg by mouth Daily. Med Name: niacin  mg capsule,extended release; Indication: lipids     • nitroglycerin (NITROSTAT) 0.4 MG SL tablet Place 0.4 mg under the tongue As Needed for chest pain. as directed     • simvastatin (ZOCOR) 40 MG tablet Take 40 mg by mouth Daily. Indication: cholesterol     • vitamin C (ASCORBIC ACID) 500 MG tablet Take 500 mg by mouth 2 (Two) Times a Day. Indication: supplement     • ranolazine (RANEXA) 500 MG 12 hr tablet Take 1 tablet by mouth 2 (Two) Times a Day. 60 tablet 12     No current facility-administered medications for this visit.          Review of Systems     Constitution: Denies any fatigue, fever or chills    HENT: Mild  "hearing impairment,     Eyes: History of retinopathy needing laser in the eye due to bleeding     Cardivascular - As per history of present illness     Respiratory system-NYHA class II dyspnea       Endocrine:   history of hyperlipidemia,                      Hypothyrodism       Musculoskeletal:   history of arthritis with musculoskeletal problems    Gastrointestinal: No nausea, vomiting, or melena    Genitourinary: Has BPH and is chronic kidney disease stage III    Neurological:   No history of seizure disorder, stroke, memory problems    Psychiatric/Behavioral:        No history of depression    Hematological- no history of easy bruising             OBJECTIVE    /70   Pulse 59   Ht 175.3 cm (69.02\")   Wt 90.7 kg (200 lb)   BMI 29.52 kg/m²       Physical Exam     Constitutional: is oriented to person, place, and time.     Skin-warm and dry    Well developed and nourished in no acute distress      Head: Normocephalic and atraumatic.     Eyes: Right eye is legally blind    Neck: Neck supple. No bruit in the carotids    Cardiovascular: [place] in the fifth intercostal space   Regular rate, and  Rhythm,    S1 greater than S2, 2/6 systolic murmur at the apex    Pulmonary/Chest:   Air  Entry is equal on both sides  No wheezing or crackles,      Abdominal: Soft.  No hepatosplenomegaly, bowel sounds are present    Musculoskeletal: No kyphoscoliosis, no significant thickening of the joints    Neurological: is alert and oriented to person, place, and time.    cranial nerve are intact .   No motor or sensory deficit    Extremities-no edema, no radial femoral delay      Psychiatric: He has a normal mood and affect.                  His behavior is normal.           Procedures      Lab Results   Component Value Date    WBC 6.9 06/08/2016    HGB 11.9 (L) 06/08/2016    HCT 34.4 (L) 06/08/2016    MCV 91.5 06/08/2016     (L) 06/08/2016     Lab Results   Component Value Date    GLUCOSE 75 12/14/2018    BUN 19 " 12/14/2018    CREATININE 1.48 (H) 12/14/2018    EGFRIFNONA 46 12/14/2018    BCR 12.8 12/14/2018    CO2 22.0 12/14/2018    CALCIUM 8.9 12/14/2018    ALBUMIN 3.70 12/14/2018    ALT 23 05/19/2014                  A/P    Chronic stable angina with history of CABG, last cardiac catheterization June 2016 grafts patent and stent to the circumflex patent,  Has chronic stable angina.  He has been on nitrates and beta blockers and Ace inhibitors.  Started him on aspirin every other day since he was taking it every day with his prior history of bleeding into the eye, and added Ranexa 500 twice daily    Hyperlipidemia on niacin and simvastatin.    Diabetes on insulin.    Chronic kidney disease stage -3 table and being followed with Dr. Dionicio NAVA on [facility].    Hypertension-on atenolol, lisinopril, Imdur.    Follow-up in 6 months            This document has been electronically signed by Ze Vines MD on May 30, 2019 10:56 AM       EMR Dragon/Transcription disclaimer:   Some of this note may be an electronic transcription/translation of spoken language to printed text. The electronic translation of spoken language may permit erroneous, or at times, nonsensical words or phrases to be inadvertently transcribed; Although I have reviewed the note for such errors, some may still exist.

## 2019-12-10 ENCOUNTER — OFFICE VISIT (OUTPATIENT)
Dept: CARDIOLOGY | Facility: CLINIC | Age: 74
End: 2019-12-10

## 2019-12-10 VITALS
SYSTOLIC BLOOD PRESSURE: 136 MMHG | OXYGEN SATURATION: 98 % | HEART RATE: 66 BPM | HEIGHT: 69 IN | WEIGHT: 202.4 LBS | BODY MASS INDEX: 29.98 KG/M2 | DIASTOLIC BLOOD PRESSURE: 54 MMHG

## 2019-12-10 DIAGNOSIS — I10 ESSENTIAL HYPERTENSION: ICD-10-CM

## 2019-12-10 DIAGNOSIS — E78.00 PURE HYPERCHOLESTEROLEMIA: Primary | ICD-10-CM

## 2019-12-10 DIAGNOSIS — E11.9 TYPE 2 DIABETES MELLITUS WITHOUT COMPLICATION, WITH LONG-TERM CURRENT USE OF INSULIN (HCC): ICD-10-CM

## 2019-12-10 DIAGNOSIS — Z79.4 TYPE 2 DIABETES MELLITUS WITHOUT COMPLICATION, WITH LONG-TERM CURRENT USE OF INSULIN (HCC): ICD-10-CM

## 2019-12-10 DIAGNOSIS — R06.09 DYSPNEA ON EXERTION: ICD-10-CM

## 2019-12-10 DIAGNOSIS — I25.708 CORONARY ARTERY DISEASE OF BYPASS GRAFT OF NATIVE HEART WITH STABLE ANGINA PECTORIS (HCC): ICD-10-CM

## 2019-12-10 PROCEDURE — 99214 OFFICE O/P EST MOD 30 MIN: CPT | Performed by: INTERNAL MEDICINE

## 2019-12-10 NOTE — PROGRESS NOTES
[facility] Cardiology  OFFICE NOTE    Cardiovascular Medicine  Oc Cameron M.D., RPVI         No referring provider defined for this encounter.    Thank you for asking me to see Thaddeus William for CAD.    History of Present Illness  This is a 74 y.o. male with:    1. Coronary artery disease involving native coronary artery of native heart without angina pectoris    2. Pure hypercholesterolemia    3. Essential hypertension    4. Stage 3 chronic kidney disease (CMS/HCC)          Chief complaint -Follow-up CAD        History of present Illness- 74-year-old gentleman who is history of coronary disease status post CABG in 2008 with LIMA to LAD, SVG to the PDA and had stent placement to left circumflex artery had a cardiac catheterization June 2016 showed the grafts patent and the native circumflex stents patent.  He has chronic stable angina on multiple medicines and he has chronic kidney disease stage III.  [facility].  He is currently  on aspirin 81 mg daily which he had stopped before because of  prior bleeding into the eye.  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  He does have chronic stable angina, started him on Ranexa 500 twice daily and asked him to take the aspirin every other day    12/10/2019:  No acute issues since last visit.  Continues to have this left-sided chest pain, which appears atypical, it is mostly resting, his pain goes away when he is exerting himself  Also has chronic lower extremity swelling, has used compression stockings with some improvement  Denying orthopnea or PND.  No palpitations        Review of Systems - ROS  Constitution: Negative for weakness, weight gain and weight loss.   HENT: Negative for congestion.    Eyes:  Negative for blurred vision.   Cardiovascular: As mentioned above  Respiratory: Negative for cough and hemoptysis.    Endocrine: Negative for polydipsia and polyuria.   Hematologic/Lymphatic: Negative for bleeding problem. Does not bruise/bleed easily.   Skin: Negative for flushing.   Musculoskeletal: Negative for neck pain and stiffness.   Gastrointestinal: Negative for abdominal pain, diarrhea, jaundice, melena, nausea and vomiting.   Genitourinary: Negative for dysuria and hematuria.   Neurological: Negative for dizziness, focal weakness and numbness.   Psychiatric/Behavioral: Negative for altered mental status and depression.          All other systems were reviewed and were negative.    family history includes Cancer in an other family member; Diabetes in an other family member; Heart disease in an other family member; Hypertension in an other family member; Stroke in an other family member.     reports that he has never smoked. He has never used smokeless tobacco. He reports that he drinks alcohol. He reports that he does not use drugs.    Allergies   Allergen Reactions   • Codeine Hives and Itching         Current Outpatient Medications:   •  acetaZOLAMIDE (DIAMOX) 250 MG tablet, Take 250 mg by mouth 2 (Two) Times a Day. Indication: eye, Disp: , Rfl:   •  aspirin 81 MG chewable tablet, Chew 81 mg Daily., Disp: , Rfl:   •  atenolol (TENORMIN) 50 MG tablet, Take 50 mg by mouth Daily. Indication: heart, Disp: , Rfl:   •  Cholecalciferol (VITAMIN D PO), Take 1 tablet by mouth Daily. Indication: supplement , Disp: , Rfl:   •  cyanocobalamin (VITAMIN B-12) 1000 MCG tablet, Take 1,000 mcg by mouth Daily. Indication: supplement, Disp: , Rfl:   •  doxazosin (CARDURA) 4 MG tablet, Take 4 mg by mouth every night at bedtime., Disp: , Rfl: 0  •  furosemide (LASIX) 20 MG tablet, Take 20 mg by mouth Every Other Day. Indication: fluid, Disp: , Rfl:   •  insulin glargine (LANTUS) 100 UNIT/ML injection, Inject 41 Units under  "the skin into the appropriate area as directed Daily. (insulin); Med Name: Lantus 100 unit/mL subcutaneous solution; Indication: DIABETES , Disp: , Rfl:   •  insulin lispro (HUMALOG) 100 UNIT/ML injection, Inject 20 Units under the skin 3 (Three) Times a Day As Needed. (insulin); Med Name: Humalog 100 unit/mL subcutaneous solution; Indication: DIABETES, Disp: , Rfl:   •  isosorbide mononitrate (IMDUR) 60 MG 24 hr tablet, Take 60 mg by mouth Daily. 1/2 tablet daily, Disp: , Rfl: 0  •  lisinopril (PRINIVIL,ZESTRIL) 20 MG tablet, Take 20 mg by mouth Daily. Indication: bp, Disp: , Rfl:   •  nitroglycerin (NITROSTAT) 0.4 MG SL tablet, Place 0.4 mg under the tongue As Needed for chest pain. as directed, Disp: , Rfl:   •  ranolazine (RANEXA) 500 MG 12 hr tablet, Take 1 tablet by mouth 2 (Two) Times a Day., Disp: 60 tablet, Rfl: 12  •  vitamin C (ASCORBIC ACID) 500 MG tablet, Take 500 mg by mouth 2 (Two) Times a Day. Indication: supplement, Disp: , Rfl:     Physical Exam:  Vitals:    12/10/19 1114   BP: 136/54   BP Location: Left arm   Patient Position: Sitting   Cuff Size: Adult   Pulse: 66   SpO2: 98%   Weight: 91.8 kg (202 lb 6.4 oz)   Height: 175.3 cm (69.02\")   PainSc: 0-No pain     Current Pain Level: none  Pulse Ox: Normal  on room air  General: alert, appears stated age and cooperative     Body Habitus: well-nourished    HEENT: Head: Normocephalic, no lesions, without obvious abnormality. No arcus senilis, xanthelasma or xanthomas.    Neuro: alert, oriented x3  Pulses: 2+ and symmetric  JVP: Volume/Pulsation: Normal.  Normal waveforms.   Appropriate inspiratory decrease.  No Kussmaul's. No Ronda's.   Carotid Exam: no bruit normal pulsation bilaterally   Carotid Volume: normal.     Respirations: no increased work of breathing   Chest:  Normal    Pulmonary:Normal   Precordium: Normal impulses. P2 is not palpable.  RV Heave: absent  LV Heave: absent  [place]:  normal size and placement  Palpable S4: absent.  Heart " rate: normal    Heart Rhythm: regular     Heart Sounds: S1: normal  S2: normal  S3: absent   S4: absent  Opening Snap: absent    Pericardial Rub:  Absent: .    Abdomen:   Appearance: normal .  Palpation: Soft, non-tender to palpation, bowel sounds positive in all four quadrants; no guarding or rebound tenderness  Extremity: +1 pitting edema.  LE Skin: no rashes  LE Hair:  normal  LE Pulses: well perfused with normal pulses in the distal extremities  Pallor on elevation: Absent. Rubor on dependency: None      DATA REVIEWED:       ECG/EMG Results (all)     None        ---------------------------------------------------  TTE/JOSE M:  Results for orders placed in visit on 03/27/18   Adult Transthoracic Echo Complete W/ Cont if Necessary Per Protocol    Narrative · Left ventricular systolic function is normal. Estimated EF = 57%.  · Left ventricular diastolic dysfunction (grade I) consistent with   impaired relaxation.  · Left atrial cavity size is mildly dilated.        --------------------------------------------------------------------------------------------------  LABS:     The CVD Risk score (YONATHAN'Agostino, et al., 2008) failed to calculate for the following reasons:    The 2008 CVD risk score is only valid for ages 30 to 74    The patient has a prior MI, stroke, CHF, or peripheral vascular disease diagnosis         Lab Results   Component Value Date    GLUCOSE 75 12/14/2018    BUN 19 12/14/2018    CREATININE 1.48 (H) 12/14/2018    EGFRIFNONA 46 12/14/2018    BCR 12.8 12/14/2018    K 3.8 12/14/2018    CO2 22.0 12/14/2018    CALCIUM 8.9 12/14/2018    ALBUMIN 3.70 12/14/2018    ALT 23 05/19/2014     Lab Results   Component Value Date    WBC 6.9 06/08/2016    HGB 11.9 (L) 06/08/2016    HCT 34.4 (L) 06/08/2016    MCV 91.5 06/08/2016     (L) 06/08/2016     Lab Results   Component Value Date    CHLPL 97 05/19/2014    TRIG 51 05/19/2014    LDL 54 05/19/2014     No results found for: TSH, A4POEYC, O1GOTON, THYROIDAB  No  results found for: CKTOTAL, CKMB, CKMBINDEX, TROPONINI, TROPONINT  Lab Results   Component Value Date    HGBA1C 7.3 (H) 05/19/2014     No results found for: DDIMER  Lab Results   Component Value Date    ALT 23 05/19/2014     Lab Results   Component Value Date    HGBA1C 7.3 (H) 05/19/2014     Lab Results   Component Value Date    CREATININE 1.48 (H) 12/14/2018     No results found for: IRON, TIBC, FERRITIN  Lab Results   Component Value Date    INR 1.2 06/08/2016    PROTIME 15.0 06/08/2016       Assessment/Plan     1. Chronic stable angina with history of CABG, last cardiac catheterization June 2016 grafts patent and stent to the circumflex patent,  Has chronic stable angina.  He has been on nitrates and beta blockers and Ace inhibitors.  Started him on aspirin every other day since he was taking it every day with his prior history of bleeding into the eye, and  Ranexa 500 twice daily  No new symptoms.  His chest pain is rather atypical.    2. Hyperlipidemia on niacin and simvastatin.  We will get blood work from you.    3. Diabetes on insulin.     4. Chronic kidney disease stage -3 table and being followed with Dr. Greenberg    5. Hypertension-on atenolol, lisinopril, Imdur.    6.  Lower extremity swelling:  Chronic, is edema CKD and diastolic heart failure.  I advised him for low-salt diet, compression stockings, elevating his legs.  We will get an echocardiogram reassess LV function.      Prevention:  Patient's Body mass index is 29.88 kg/m². BMI is above normal parameters. Recommendations include: exercise counseling, none (medical contraindication) and nutrition counseling.      Thaddeus William is not a smoker    AAA Screening:   Not needed    Return in about 8 months (around 8/10/2020).          This document has been electronically signed by Oc Cameron MD on December 10, 2019 11:48 AM

## 2019-12-12 ENCOUNTER — TRANSCRIBE ORDERS (OUTPATIENT)
Dept: LAB | Facility: HOSPITAL | Age: 74
End: 2019-12-12

## 2019-12-12 ENCOUNTER — APPOINTMENT (OUTPATIENT)
Dept: LAB | Facility: HOSPITAL | Age: 74
End: 2019-12-12

## 2019-12-12 DIAGNOSIS — N18.30 CHRONIC KIDNEY DISEASE, STAGE III (MODERATE) (HCC): Primary | ICD-10-CM

## 2019-12-12 LAB
ALBUMIN SERPL-MCNC: 4.1 G/DL (ref 3.5–5.2)
ANION GAP SERPL CALCULATED.3IONS-SCNC: 9.7 MMOL/L (ref 5–15)
BUN BLD-MCNC: 18 MG/DL (ref 8–23)
BUN/CREAT SERPL: 10.6 (ref 7–25)
CALCIUM SPEC-SCNC: 9.2 MG/DL (ref 8.6–10.5)
CHLORIDE SERPL-SCNC: 108 MMOL/L (ref 98–107)
CO2 SERPL-SCNC: 20.3 MMOL/L (ref 22–29)
CREAT BLD-MCNC: 1.7 MG/DL (ref 0.76–1.27)
GFR SERPL CREATININE-BSD FRML MDRD: 39 ML/MIN/1.73
GLUCOSE BLD-MCNC: 143 MG/DL (ref 65–99)
PHOSPHATE SERPL-MCNC: 3.1 MG/DL (ref 2.5–4.5)
POTASSIUM BLD-SCNC: 4.1 MMOL/L (ref 3.5–5.2)
PTH-INTACT SERPL-MCNC: 27 PG/ML (ref 15–65)
SODIUM BLD-SCNC: 138 MMOL/L (ref 136–145)

## 2019-12-12 PROCEDURE — 83970 ASSAY OF PARATHORMONE: CPT | Performed by: INTERNAL MEDICINE

## 2019-12-12 PROCEDURE — 82570 ASSAY OF URINE CREATININE: CPT | Performed by: INTERNAL MEDICINE

## 2019-12-12 PROCEDURE — 36415 COLL VENOUS BLD VENIPUNCTURE: CPT | Performed by: INTERNAL MEDICINE

## 2019-12-12 PROCEDURE — 80069 RENAL FUNCTION PANEL: CPT | Performed by: INTERNAL MEDICINE

## 2019-12-12 PROCEDURE — 84156 ASSAY OF PROTEIN URINE: CPT | Performed by: INTERNAL MEDICINE

## 2019-12-13 LAB
CREAT UR-MCNC: 91.7 MG/DL
PROT UR-MCNC: 9 MG/DL
PROT/CREAT UR: 98.1 MG/G CREA (ref 0–200)

## 2019-12-17 ENCOUNTER — TRANSCRIBE ORDERS (OUTPATIENT)
Dept: LAB | Facility: HOSPITAL | Age: 74
End: 2019-12-17

## 2019-12-17 DIAGNOSIS — N18.30 CHRONIC KIDNEY DISEASE, STAGE III (MODERATE) (HCC): Primary | ICD-10-CM

## 2019-12-20 LAB
BH CV ECHO MEAS - ACS: 1.6 CM
BH CV ECHO MEAS - AO ISTHMUS: 2.3 CM
BH CV ECHO MEAS - AO MAX PG (FULL): 5.8 MMHG
BH CV ECHO MEAS - AO MAX PG: 8.1 MMHG
BH CV ECHO MEAS - AO MEAN PG (FULL): 3 MMHG
BH CV ECHO MEAS - AO MEAN PG: 5 MMHG
BH CV ECHO MEAS - AO ROOT AREA (BSA CORRECTED): 1.4
BH CV ECHO MEAS - AO ROOT AREA: 6.6 CM^2
BH CV ECHO MEAS - AO ROOT DIAM: 2.9 CM
BH CV ECHO MEAS - AO V2 MAX: 142 CM/SEC
BH CV ECHO MEAS - AO V2 MEAN: 99.5 CM/SEC
BH CV ECHO MEAS - AO V2 VTI: 34.1 CM
BH CV ECHO MEAS - ASC AORTA: 2.7 CM
BH CV ECHO MEAS - AVA(I,A): 2.2 CM^2
BH CV ECHO MEAS - AVA(I,D): 2.2 CM^2
BH CV ECHO MEAS - AVA(V,A): 1.8 CM^2
BH CV ECHO MEAS - AVA(V,D): 1.8 CM^2
BH CV ECHO MEAS - BSA(HAYCOCK): 2.1 M^2
BH CV ECHO MEAS - BSA: 2.1 M^2
BH CV ECHO MEAS - BZI_BMI: 29.8 KILOGRAMS/M^2
BH CV ECHO MEAS - BZI_METRIC_HEIGHT: 175.3 CM
BH CV ECHO MEAS - BZI_METRIC_WEIGHT: 91.6 KG
BH CV ECHO MEAS - EDV(CUBED): 86.9 ML
BH CV ECHO MEAS - EDV(MOD-SP2): 79.5 ML
BH CV ECHO MEAS - EDV(MOD-SP4): 87.6 ML
BH CV ECHO MEAS - EDV(TEICH): 89.1 ML
BH CV ECHO MEAS - EF(CUBED): 58.7 %
BH CV ECHO MEAS - EF(MOD-SP2): 43.5 %
BH CV ECHO MEAS - EF(MOD-SP4): 57.6 %
BH CV ECHO MEAS - EF(TEICH): 50.5 %
BH CV ECHO MEAS - EPSS: 0.6 CM
BH CV ECHO MEAS - ESV(CUBED): 35.9 ML
BH CV ECHO MEAS - ESV(MOD-SP2): 44.9 ML
BH CV ECHO MEAS - ESV(MOD-SP4): 37.1 ML
BH CV ECHO MEAS - ESV(TEICH): 44.1 ML
BH CV ECHO MEAS - FS: 25.5 %
BH CV ECHO MEAS - IVS/LVPW: 0.91
BH CV ECHO MEAS - IVSD: 1.2 CM
BH CV ECHO MEAS - LA DIMENSION: 4.5 CM
BH CV ECHO MEAS - LA/AO: 1.6
BH CV ECHO MEAS - LV DIASTOLIC VOL/BSA (35-75): 42.2 ML/M^2
BH CV ECHO MEAS - LV MASS(C)D: 202.8 GRAMS
BH CV ECHO MEAS - LV MASS(C)DI: 97.8 GRAMS/M^2
BH CV ECHO MEAS - LV MAX PG: 2.2 MMHG
BH CV ECHO MEAS - LV MEAN PG: 2 MMHG
BH CV ECHO MEAS - LV SYSTOLIC VOL/BSA (12-30): 17.9 ML/M^2
BH CV ECHO MEAS - LV V1 MAX: 74.6 CM/SEC
BH CV ECHO MEAS - LV V1 MEAN: 59.1 CM/SEC
BH CV ECHO MEAS - LV V1 VTI: 21.2 CM
BH CV ECHO MEAS - LVIDD: 4.4 CM
BH CV ECHO MEAS - LVIDS: 3.3 CM
BH CV ECHO MEAS - LVLD AP2: 8.7 CM
BH CV ECHO MEAS - LVLD AP4: 8.4 CM
BH CV ECHO MEAS - LVLS AP2: 7.6 CM
BH CV ECHO MEAS - LVLS AP4: 7.2 CM
BH CV ECHO MEAS - LVOT AREA (M): 3.5 CM^2
BH CV ECHO MEAS - LVOT AREA: 3.5 CM^2
BH CV ECHO MEAS - LVOT DIAM: 2.1 CM
BH CV ECHO MEAS - LVPWD: 1.3 CM
BH CV ECHO MEAS - MR MAX PG: 58.7 MMHG
BH CV ECHO MEAS - MR MAX VEL: 383 CM/SEC
BH CV ECHO MEAS - MV A MAX VEL: 54.5 CM/SEC
BH CV ECHO MEAS - MV DEC SLOPE: 339 CM/SEC^2
BH CV ECHO MEAS - MV E MAX VEL: 108 CM/SEC
BH CV ECHO MEAS - MV E/A: 2
BH CV ECHO MEAS - MV MAX PG: 3.8 MMHG
BH CV ECHO MEAS - MV MEAN PG: 1 MMHG
BH CV ECHO MEAS - MV P1/2T MAX VEL: 108 CM/SEC
BH CV ECHO MEAS - MV P1/2T: 93.3 MSEC
BH CV ECHO MEAS - MV V2 MAX: 97.3 CM/SEC
BH CV ECHO MEAS - MV V2 MEAN: 53.6 CM/SEC
BH CV ECHO MEAS - MV V2 VTI: 24.7 CM
BH CV ECHO MEAS - MVA P1/2T LCG: 2 CM^2
BH CV ECHO MEAS - MVA(P1/2T): 2.4 CM^2
BH CV ECHO MEAS - MVA(VTI): 3 CM^2
BH CV ECHO MEAS - PA MAX PG: 4 MMHG
BH CV ECHO MEAS - PA MEAN PG: 2 MMHG
BH CV ECHO MEAS - PA V2 MAX: 100 CM/SEC
BH CV ECHO MEAS - PA V2 MEAN: 71.1 CM/SEC
BH CV ECHO MEAS - PA V2 VTI: 21.9 CM
BH CV ECHO MEAS - PI END-D VEL: 97.7 CM/SEC
BH CV ECHO MEAS - RAP SYSTOLE: 3 MMHG
BH CV ECHO MEAS - RVSP: 56 MMHG
BH CV ECHO MEAS - SI(AO): 108.6 ML/M^2
BH CV ECHO MEAS - SI(CUBED): 24.6 ML/M^2
BH CV ECHO MEAS - SI(LVOT): 35.4 ML/M^2
BH CV ECHO MEAS - SI(MOD-SP2): 16.7 ML/M^2
BH CV ECHO MEAS - SI(MOD-SP4): 24.3 ML/M^2
BH CV ECHO MEAS - SI(TEICH): 21.7 ML/M^2
BH CV ECHO MEAS - SV(AO): 225.2 ML
BH CV ECHO MEAS - SV(CUBED): 51 ML
BH CV ECHO MEAS - SV(LVOT): 73.4 ML
BH CV ECHO MEAS - SV(MOD-SP2): 34.6 ML
BH CV ECHO MEAS - SV(MOD-SP4): 50.5 ML
BH CV ECHO MEAS - SV(TEICH): 45 ML
BH CV ECHO MEAS - TR MAX VEL: 364 CM/SEC
BH CV VAS BP LEFT ARM: NORMAL MMHG

## 2020-01-02 DIAGNOSIS — I27.20 PULMONARY HYPERTENSION (HCC): Primary | ICD-10-CM

## 2020-01-02 NOTE — PROGRESS NOTES
Echo results discussed with patient. Patient to get PFTs and sleep medicine referral per Dr Cameron. Orders placed.  made aware

## 2020-01-13 ENCOUNTER — OFFICE VISIT (OUTPATIENT)
Dept: PULMONOLOGY | Facility: CLINIC | Age: 75
End: 2020-01-13

## 2020-01-13 DIAGNOSIS — I27.20 PULMONARY HYPERTENSION (HCC): Primary | ICD-10-CM

## 2020-01-13 PROCEDURE — 94729 DIFFUSING CAPACITY: CPT | Performed by: INTERNAL MEDICINE

## 2020-01-13 PROCEDURE — 94727 GAS DIL/WSHOT DETER LNG VOL: CPT | Performed by: INTERNAL MEDICINE

## 2020-01-13 PROCEDURE — 94060 EVALUATION OF WHEEZING: CPT | Performed by: INTERNAL MEDICINE

## 2020-10-06 ENCOUNTER — OFFICE VISIT (OUTPATIENT)
Dept: CARDIOLOGY | Facility: CLINIC | Age: 75
End: 2020-10-06

## 2020-10-06 VITALS
BODY MASS INDEX: 29.03 KG/M2 | OXYGEN SATURATION: 99 % | WEIGHT: 196 LBS | HEART RATE: 73 BPM | HEIGHT: 69 IN | SYSTOLIC BLOOD PRESSURE: 125 MMHG | DIASTOLIC BLOOD PRESSURE: 55 MMHG

## 2020-10-06 DIAGNOSIS — I25.10 CORONARY ARTERY DISEASE INVOLVING NATIVE CORONARY ARTERY OF NATIVE HEART WITHOUT ANGINA PECTORIS: Primary | ICD-10-CM

## 2020-10-06 PROCEDURE — 99214 OFFICE O/P EST MOD 30 MIN: CPT | Performed by: INTERNAL MEDICINE

## 2020-10-06 PROCEDURE — 93000 ELECTROCARDIOGRAM COMPLETE: CPT | Performed by: INTERNAL MEDICINE

## 2020-10-06 RX ORDER — ZOLPIDEM TARTRATE 5 MG/1
1 TABLET ORAL NIGHTLY
COMMUNITY
End: 2021-10-06

## 2020-10-06 RX ORDER — SIMVASTATIN 40 MG
1 TABLET ORAL
COMMUNITY

## 2020-10-06 NOTE — PROGRESS NOTES
[facility]  OFFICE NOTE    Cardiovascular Medicine  Oc Cameron M.D., RPVI         No referring provider defined for this encounter.    Thank you for asking me to see Thaddeus William for CAD.    History of Present Illness  This is a 75 y.o. male with:    1. Coronary artery disease involving native coronary artery of native heart without angina pectoris    2. Pure hypercholesterolemia    3. Essential hypertension    4. Stage 3 chronic kidney disease (CMS/HCC)          Chief complaint -Follow-up CAD        History of present Illness- 74-year-old gentleman who is history of coronary disease status post CABG in 2008 with LIMA to LAD, SVG to the PDA and had stent placement to left circumflex artery had a cardiac catheterization June 2016 showed the grafts patent and the native circumflex stents patent.  He has chronic stable angina on multiple medicines and he has chronic kidney disease stage III.  [facility].  He is currently  on aspirin 81 mg daily which he had stopped before because of  prior bleeding into the eye.  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  He does have chronic stable angina, started him on Ranexa 500 twice daily and asked him to take the aspirin every other day    12/10/2019:  No acute issues since last visit.  Continues to have this left-sided chest pain, which appears atypical, it is mostly resting, his pain goes away when he is exerting himself  Also has chronic lower extremity swelling, has used compression stockings with some improvement  Denying orthopnea or PND.  No palpitations    10/06/2020:  No acute issues since last visit.  No evidence of accelerating angina.  Lower extremity swelling is improved.          Review  of Systems - ROS  Constitution: Negative for weakness, weight gain and weight loss.   HENT: Negative for congestion.    Eyes: Negative for blurred vision.   Cardiovascular: As mentioned above  Respiratory: Negative for cough and hemoptysis.    Endocrine: Negative for polydipsia and polyuria.   Hematologic/Lymphatic: Negative for bleeding problem. Does not bruise/bleed easily.   Skin: Negative for flushing.   Musculoskeletal: Negative for neck pain and stiffness.   Gastrointestinal: Negative for abdominal pain, diarrhea, jaundice, melena, nausea and vomiting.   Genitourinary: Negative for dysuria and hematuria.   Neurological: Negative for dizziness, focal weakness and numbness.   Psychiatric/Behavioral: Negative for altered mental status and depression.          All other systems were reviewed and were negative.    family history includes Cancer in an other family member; Diabetes in an other family member; Heart disease in an other family member; Hypertension in an other family member; Stroke in an other family member.     reports that he has never smoked. He has never used smokeless tobacco. He reports current alcohol use. He reports that he does not use drugs.    Allergies   Allergen Reactions   • Codeine Hives and Itching         Current Outpatient Medications:   •  acetaZOLAMIDE (DIAMOX) 250 MG tablet, Take 250 mg by mouth 2 (Two) Times a Day. Indication: eye, Disp: , Rfl:   •  aspirin 81 MG chewable tablet, Chew 81 mg Daily., Disp: , Rfl:   •  atenolol (TENORMIN) 50 MG tablet, Take 50 mg by mouth Daily. Indication: heart, Disp: , Rfl:   •  Cholecalciferol (VITAMIN D PO), Take 1 tablet by mouth Daily. Indication: supplement , Disp: , Rfl:   •  cyanocobalamin (VITAMIN B-12) 1000 MCG tablet, Take 1,000 mcg by mouth Daily. Indication: supplement, Disp: , Rfl:   •  doxazosin (CARDURA) 4 MG tablet, Take 4 mg by mouth every night at bedtime., Disp: , Rfl: 0  •  furosemide (LASIX) 20 MG tablet, Take 20 mg by mouth  "Every Other Day. Indication: fluid, Disp: , Rfl:   •  insulin glargine (LANTUS) 100 UNIT/ML injection, Inject 41 Units under the skin into the appropriate area as directed Daily. (insulin); Med Name: Lantus 100 unit/mL subcutaneous solution; Indication: DIABETES , Disp: , Rfl:   •  insulin lispro (HUMALOG) 100 UNIT/ML injection, Inject 20 Units under the skin 3 (Three) Times a Day As Needed. (insulin); Med Name: Humalog 100 unit/mL subcutaneous solution; Indication: DIABETES, Disp: , Rfl:   •  isosorbide mononitrate (IMDUR) 60 MG 24 hr tablet, Take 60 mg by mouth Daily. 1/2 tablet daily, Disp: , Rfl: 0  •  lisinopril (PRINIVIL,ZESTRIL) 20 MG tablet, Take 20 mg by mouth Daily. Indication: bp, Disp: , Rfl:   •  nitroglycerin (NITROSTAT) 0.4 MG SL tablet, Place 0.4 mg under the tongue As Needed for chest pain. as directed, Disp: , Rfl:   •  ranolazine (RANEXA) 500 MG 12 hr tablet, Take 1 tablet by mouth 2 (Two) Times a Day., Disp: 60 tablet, Rfl: 12  •  simvastatin (ZOCOR) 40 MG tablet, Take 1 tablet by mouth Daily., Disp: , Rfl:   •  vitamin C (ASCORBIC ACID) 500 MG tablet, Take 500 mg by mouth 2 (Two) Times a Day. Indication: supplement, Disp: , Rfl:   •  zolpidem (Ambien) 5 MG tablet, Take 1 tablet by mouth Every Night., Disp: , Rfl:     Physical Exam:  Vitals:    10/06/20 1053   BP: 125/55   BP Location: Left arm   Patient Position: Sitting   Cuff Size: Adult   Pulse: 73   SpO2: 99%   Weight: 88.9 kg (196 lb)   Height: 175.3 cm (69\")     Current Pain Level: none  Pulse Ox: Normal  on room air  General: alert, appears stated age and cooperative     Body Habitus: well-nourished    HEENT: Head: Normocephalic, no lesions, without obvious abnormality. No arcus senilis, xanthelasma or xanthomas.    Neuro: alert, oriented x3  Pulses: 2+ and symmetric  JVP: Volume/Pulsation: Normal.  Normal waveforms.   Appropriate inspiratory decrease.  No Kussmaul's. No Ronda's.   Carotid Exam: no bruit normal pulsation " bilaterally   Carotid Volume: normal.     Respirations: no increased work of breathing   Chest:  Normal    Pulmonary:Normal   Precordium: Normal impulses. P2 is not palpable.  RV Heave: absent  LV Heave: absent  [place]:  normal size and placement  Palpable S4: absent.  Heart rate: normal    Heart Rhythm: regular     Heart Sounds: S1: normal  S2: normal  S3: absent   S4: absent  Opening Snap: absent    Pericardial Rub:  Absent: .    Abdomen:   Appearance: normal .  Palpation: Soft, non-tender to palpation, bowel sounds positive in all four quadrants; no guarding or rebound tenderness  Extremity: +1 pitting edema.  LE Skin: no rashes  LE Hair:  normal  LE Pulses: well perfused with normal pulses in the distal extremities  Pallor on elevation: Absent. Rubor on dependency: None      DATA REVIEWED:       ECG/EMG Results (all)     None        ---------------------------------------------------  TTE/JOSE M:  Results for orders placed in visit on 12/10/19   Adult Transthoracic Echo Complete W/ Cont if Necessary Per Protocol    Narrative · Estimated EF appears to be in the range of 51 - 55%.  · Left ventricular systolic function is normal.  · Left ventricular wall thickness is consistent with mild concentric   hypertrophy.  · Mild tricuspid valve regurgitation is present.  · Estimated right ventricular systolic pressure from tricuspid   regurgitation is markedly elevated (>55 mmHg). Moderate to severe   pulmonary hypertension is present.  · Left ventricular diastolic function is normal.        --------------------------------------------------------------------------------------------------  LABS:     The CVD Risk score (Jose C et al., 2008) failed to calculate for the following reasons:    The 2008 CVD risk score is only valid for ages 30 to 74    The patient has a prior MI, stroke, CHF, or peripheral vascular disease diagnosis         Lab Results   Component Value Date    GLUCOSE 143 (H) 12/12/2019    BUN 18 12/12/2019     CREATININE 1.70 (H) 12/12/2019    EGFRIFNONA 39 (L) 12/12/2019    BCR 10.6 12/12/2019    K 4.1 12/12/2019    CO2 20.3 (L) 12/12/2019    CALCIUM 9.2 12/12/2019    ALBUMIN 4.10 12/12/2019    ALT 23 05/19/2014     Lab Results   Component Value Date    WBC 6.9 06/08/2016    HGB 11.9 (L) 06/08/2016    HCT 34.4 (L) 06/08/2016    MCV 91.5 06/08/2016     (L) 06/08/2016     Lab Results   Component Value Date    CHLPL 97 05/19/2014    TRIG 51 05/19/2014    LDL 54 05/19/2014     No results found for: TSH, E0CPDNK, E6NCAIP, THYROIDAB  No results found for: CKTOTAL, CKMB, CKMBINDEX, TROPONINI, TROPONINT  Lab Results   Component Value Date    HGBA1C 7.3 (H) 05/19/2014     No results found for: DDIMER  Lab Results   Component Value Date    ALT 23 05/19/2014     Lab Results   Component Value Date    HGBA1C 7.3 (H) 05/19/2014     Lab Results   Component Value Date    CREATININE 1.70 (H) 12/12/2019     No results found for: IRON, TIBC, FERRITIN  Lab Results   Component Value Date    INR 1.2 06/08/2016    PROTIME 15.0 06/08/2016       Assessment/Plan     1. Chronic stable angina with history of CABG, last cardiac catheterization June 2016 grafts patent and stent to the circumflex patent,  Has chronic stable angina.  He has been on nitrates and beta blockers and Ace inhibitors.  Started him on aspirin every other day since he was taking it every day with his prior history of bleeding into the eye, and  Ranexa 500 twice daily  No new symptoms.  His chest pain is rather atypical.    2. Hyperlipidemia on niacin and simvastatin.  We will get blood work from PCP     3. Diabetes on insulin.     4. Chronic kidney disease stage -3 table and being followed with Dr. Greenberg    5. Hypertension-on atenolol, lisinopril, Imdur.    6.  Lower extremity swelling: Improved  Chronic, is edema CKD and diastolic heart failure.  I advised him for low-salt diet, compression stockings, elevating his legs.   Echocardiogram with normal LV systolic  function, he does have elevated right sided pressures.   PFTS showed some restriction but no obstructive disease.   I recommended a sleep study however patient is not interested at this time.  Consider RHC if concintues to have symptoms        Prevention:  Patient's Body mass index is 28.94 kg/m². BMI is above normal parameters. Recommendations include: exercise counseling, none (medical contraindication) and nutrition counseling.      Thaddeus William is not a smoker    AAA Screening:   Not needed    No follow-ups on file.          This document has been electronically signed by Oc Cameron MD on October 6, 2020 11:01 CDT

## 2020-12-15 ENCOUNTER — TRANSCRIBE ORDERS (OUTPATIENT)
Dept: LAB | Facility: HOSPITAL | Age: 75
End: 2020-12-15

## 2020-12-15 DIAGNOSIS — N18.30 STAGE 3 CHRONIC KIDNEY DISEASE, UNSPECIFIED WHETHER STAGE 3A OR 3B CKD (HCC): Primary | ICD-10-CM

## 2020-12-29 ENCOUNTER — LAB (OUTPATIENT)
Dept: LAB | Facility: HOSPITAL | Age: 75
End: 2020-12-29

## 2020-12-29 LAB
ALBUMIN SERPL-MCNC: 3.7 G/DL (ref 3.5–5.2)
ANION GAP SERPL CALCULATED.3IONS-SCNC: 7.3 MMOL/L (ref 5–15)
BUN SERPL-MCNC: 24 MG/DL (ref 8–23)
BUN/CREAT SERPL: 15 (ref 7–25)
CALCIUM SPEC-SCNC: 8.8 MG/DL (ref 8.6–10.5)
CHLORIDE SERPL-SCNC: 111 MMOL/L (ref 98–107)
CO2 SERPL-SCNC: 18.7 MMOL/L (ref 22–29)
CREAT SERPL-MCNC: 1.6 MG/DL (ref 0.76–1.27)
CREAT UR-MCNC: 42.8 MG/DL
GFR SERPL CREATININE-BSD FRML MDRD: 42 ML/MIN/1.73
GLUCOSE SERPL-MCNC: 240 MG/DL (ref 65–99)
PHOSPHATE SERPL-MCNC: 3.1 MG/DL (ref 2.5–4.5)
POTASSIUM SERPL-SCNC: 4.2 MMOL/L (ref 3.5–5.2)
PROT UR-MCNC: 6 MG/DL
PROT/CREAT UR: 140.2 MG/G CREA (ref 0–200)
SODIUM SERPL-SCNC: 137 MMOL/L (ref 136–145)

## 2020-12-29 PROCEDURE — 80069 RENAL FUNCTION PANEL: CPT | Performed by: INTERNAL MEDICINE

## 2020-12-29 PROCEDURE — 82570 ASSAY OF URINE CREATININE: CPT | Performed by: INTERNAL MEDICINE

## 2020-12-29 PROCEDURE — 36415 COLL VENOUS BLD VENIPUNCTURE: CPT | Performed by: INTERNAL MEDICINE

## 2020-12-29 PROCEDURE — 84156 ASSAY OF PROTEIN URINE: CPT | Performed by: INTERNAL MEDICINE

## 2021-10-06 ENCOUNTER — OFFICE VISIT (OUTPATIENT)
Dept: CARDIOLOGY | Facility: CLINIC | Age: 76
End: 2021-10-06

## 2021-10-06 VITALS
HEART RATE: 63 BPM | DIASTOLIC BLOOD PRESSURE: 66 MMHG | TEMPERATURE: 96.8 F | HEIGHT: 69 IN | BODY MASS INDEX: 28.58 KG/M2 | WEIGHT: 193 LBS | OXYGEN SATURATION: 97 % | SYSTOLIC BLOOD PRESSURE: 130 MMHG

## 2021-10-06 DIAGNOSIS — I25.10 CORONARY ARTERY DISEASE INVOLVING NATIVE CORONARY ARTERY OF NATIVE HEART WITHOUT ANGINA PECTORIS: Primary | ICD-10-CM

## 2021-10-06 LAB
QT INTERVAL: 416 MS
QTC INTERVAL: 425 MS

## 2021-10-06 PROCEDURE — 99214 OFFICE O/P EST MOD 30 MIN: CPT | Performed by: INTERNAL MEDICINE

## 2021-10-06 PROCEDURE — 93000 ELECTROCARDIOGRAM COMPLETE: CPT | Performed by: INTERNAL MEDICINE

## 2021-10-06 NOTE — PROGRESS NOTES
[facility] Cardiology  OFFICE NOTE    Cardiovascular Medicine  Oc Cameron M.D., RPVI         No referring provider defined for this encounter.    Thank you for asking me to see Thaddeus William for CAD.    History of Present Illness  This is a 76 y.o. male with:    1. Coronary artery disease involving native coronary artery of native heart without angina pectoris    2. Pure hypercholesterolemia    3. Essential hypertension    4. Stage 3 chronic kidney disease (CMS/HCC)          Chief complaint -Follow-up CAD        History of present Illness- 76 y.o.-year-old gentleman who is history of coronary disease status post CABG in 2008 with LIMA to LAD, SVG to the PDA and had stent placement to left circumflex artery had a cardiac catheterization June 2016 showed the grafts patent and the native circumflex stents patent.  He has chronic stable angina on multiple medicines and he has chronic kidney disease stage III.  [facility].  He is currently  on aspirin 81 mg daily which he had stopped before because of  prior bleeding into the eye.  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  He does have chronic stable angina, started him on Ranexa 500 twice daily and asked him to take the aspirin every other day    12/10/2019:  No acute issues since last visit.  Continues to have this left-sided chest pain, which appears atypical, it is mostly resting, his pain goes away when he is exerting himself  Also has chronic lower extremity swelling, has used compression stockings with some improvement  Denying orthopnea or PND.  No palpitations    10/06/2020:  No acute issues since last visit.  No evidence of accelerating angina.  Lower extremity swelling is  improved.    10/06/2021:  No acute issues since last visit but denying any chest pain or shortness of breath.  Lower extremity swelling is stable.        Review of Systems - ROS  Constitution: Negative for weakness, weight gain and weight loss.   HENT: Negative for congestion.    Eyes: Negative for blurred vision.   Cardiovascular: As mentioned above  Respiratory: Negative for cough and hemoptysis.    Endocrine: Negative for polydipsia and polyuria.   Hematologic/Lymphatic: Negative for bleeding problem. Does not bruise/bleed easily.   Skin: Negative for flushing.   Musculoskeletal: Negative for neck pain and stiffness.   Gastrointestinal: Negative for abdominal pain, diarrhea, jaundice, melena, nausea and vomiting.   Genitourinary: Negative for dysuria and hematuria.   Neurological: Negative for dizziness, focal weakness and numbness.   Psychiatric/Behavioral: Negative for altered mental status and depression.          All other systems were reviewed and were negative.    family history includes Cancer in an other family member; Diabetes in an other family member; Heart disease in an other family member; Hypertension in an other family member; Stroke in an other family member.     reports that he has never smoked. He has never used smokeless tobacco. He reports current alcohol use. He reports that he does not use drugs.    Allergies   Allergen Reactions   • Codeine Hives and Itching         Current Outpatient Medications:   •  acetaZOLAMIDE (DIAMOX) 250 MG tablet, Take 250 mg by mouth 2 (Two) Times a Day. Indication: eye, Disp: , Rfl:   •  aspirin 81 MG chewable tablet, Chew 81 mg Daily., Disp: , Rfl:   •  atenolol (TENORMIN) 50 MG tablet, Take 50 mg by mouth Daily. Indication: heart, Disp: , Rfl:   •  Cholecalciferol (VITAMIN D PO), Take 1 tablet by mouth Daily. Indication: supplement , Disp: , Rfl:   •  cyanocobalamin (VITAMIN B-12) 1000 MCG tablet, Take 1,000 mcg by mouth Daily. Indication: supplement, Disp: ,  "Rfl:   •  doxazosin (CARDURA) 4 MG tablet, Take 4 mg by mouth every night at bedtime., Disp: , Rfl: 0  •  furosemide (LASIX) 20 MG tablet, Take 20 mg by mouth Every Other Day. Indication: fluid, Disp: , Rfl:   •  insulin glargine (LANTUS) 100 UNIT/ML injection, Inject 41 Units under the skin into the appropriate area as directed Daily. (insulin); Med Name: Lantus 100 unit/mL subcutaneous solution; Indication: DIABETES , Disp: , Rfl:   •  insulin lispro (HUMALOG) 100 UNIT/ML injection, Inject 20 Units under the skin 3 (Three) Times a Day As Needed. (insulin); Med Name: Humalog 100 unit/mL subcutaneous solution; Indication: DIABETES, Disp: , Rfl:   •  isosorbide mononitrate (IMDUR) 60 MG 24 hr tablet, Take 30 mg by mouth Daily. 1/2 tablet daily, Disp: , Rfl: 0  •  lisinopril (PRINIVIL,ZESTRIL) 20 MG tablet, Take 20 mg by mouth Daily. Indication: bp, Disp: , Rfl:   •  ranolazine (RANEXA) 500 MG 12 hr tablet, Take 1 tablet by mouth 2 (Two) Times a Day. (Patient taking differently: Take 500 mg by mouth Daily.), Disp: 60 tablet, Rfl: 12  •  simvastatin (ZOCOR) 40 MG tablet, Take 1 tablet by mouth. 1/2 tab daily, Disp: , Rfl:   •  vitamin C (ASCORBIC ACID) 500 MG tablet, Take 500 mg by mouth 2 (Two) Times a Day. Indication: supplement, Disp: , Rfl:     Physical Exam:  Vitals:    10/06/21 1042   BP: 130/66   BP Location: Left arm   Patient Position: Sitting   Cuff Size: Adult   Pulse: 63   Temp: 96.8 °F (36 °C)   SpO2: 97%   Weight: 87.5 kg (193 lb)   Height: 175.3 cm (69\")   PainSc: 0-No pain     Current Pain Level: none  Pulse Ox: Normal  on room air  General: alert, appears stated age and cooperative     Body Habitus: well-nourished    HEENT: Head: Normocephalic, no lesions, without obvious abnormality. No arcus senilis, xanthelasma or xanthomas.    Neuro: alert, oriented x3  Pulses: 2+ and symmetric  JVP: Volume/Pulsation: Normal.  Normal waveforms.   Appropriate inspiratory decrease.  No Kussmaul's. No Ronda's. "   Carotid Exam: no bruit normal pulsation bilaterally   Carotid Volume: normal.     Respirations: no increased work of breathing   Chest:  Normal    Pulmonary:Normal   Precordium: Normal impulses. P2 is not palpable.  RV Heave: absent  LV Heave: absent  [place]:  normal size and placement  Palpable S4: absent.  Heart rate: normal    Heart Rhythm: regular     Heart Sounds: S1: normal  S2: normal  S3: absent   S4: absent  Opening Snap: absent    Pericardial Rub:  Absent: .    Abdomen:   Appearance: normal .  Palpation: Soft, non-tender to palpation, bowel sounds positive in all four quadrants; no guarding or rebound tenderness  Extremity: +1 pitting edema.  LE Skin: no rashes  LE Hair:  normal  LE Pulses: well perfused with normal pulses in the distal extremities  Pallor on elevation: Absent. Rubor on dependency: None      DATA REVIEWED:       ECG/EMG Results (all)     None        ---------------------------------------------------  TTE/JOSE M:  Results for orders placed in visit on 12/10/19    Adult Transthoracic Echo Complete W/ Cont if Necessary Per Protocol    Interpretation Summary  · Estimated EF appears to be in the range of 51 - 55%.  · Left ventricular systolic function is normal.  · Left ventricular wall thickness is consistent with mild concentric hypertrophy.  · Mild tricuspid valve regurgitation is present.  · Estimated right ventricular systolic pressure from tricuspid regurgitation is markedly elevated (>55 mmHg). Moderate to severe pulmonary hypertension is present.  · Left ventricular diastolic function is normal.    --------------------------------------------------------------------------------------------------  LABS:     The CVD Risk score (Jose C et al., 2008) failed to calculate for the following reasons:    The 2008 CVD risk score is only valid for ages 30 to 74    The patient has a prior MI, stroke, CHF, or peripheral vascular disease diagnosis         Lab Results   Component Value Date     GLUCOSE 240 (H) 12/29/2020    BUN 24 (H) 12/29/2020    CREATININE 1.60 (H) 12/29/2020    EGFRIFNONA 42 (L) 12/29/2020    BCR 15.0 12/29/2020    K 4.2 12/29/2020    CO2 18.7 (L) 12/29/2020    CALCIUM 8.8 12/29/2020    ALBUMIN 3.70 12/29/2020    ALT 23 05/19/2014     Lab Results   Component Value Date    WBC 6.9 06/08/2016    HGB 11.9 (L) 06/08/2016    HCT 34.4 (L) 06/08/2016    MCV 91.5 06/08/2016     (L) 06/08/2016     Lab Results   Component Value Date    CHLPL 97 05/19/2014    TRIG 51 05/19/2014    LDL 54 05/19/2014     No results found for: TSH, F0KDGZO, W4OTNIV, THYROIDAB  No results found for: CKTOTAL, CKMB, CKMBINDEX, TROPONINI, TROPONINT  Lab Results   Component Value Date    HGBA1C 7.3 (H) 05/19/2014     No results found for: DDIMER  Lab Results   Component Value Date    ALT 23 05/19/2014     Lab Results   Component Value Date    HGBA1C 7.3 (H) 05/19/2014     Lab Results   Component Value Date    CREATININE 1.60 (H) 12/29/2020     No results found for: IRON, TIBC, FERRITIN  Lab Results   Component Value Date    INR 1.2 06/08/2016    PROTIME 15.0 06/08/2016       Assessment/Plan     1. Chronic stable angina with history of CABG, last cardiac catheterization June 2016 grafts patent and stent to the circumflex patent,  Has chronic stable angina.  He has been on nitrates and beta blockers and Ace inhibitors.  Started him on aspirin every other day since he was taking it every day with his prior history of bleeding into the eye, and  Ranexa 500 twice daily  No new symptoms.  His chest pain is rather atypical.    2. Hyperlipidemia on niacin and simvastatin.  We will get blood work from PCP     3. Diabetes on insulin.     4. Chronic kidney disease stage -3 table and being followed with Dr. Greenberg    5. Hypertension-on atenolol, lisinopril, Imdur.    6.  Lower extremity swelling: Improved  Chronic, is edema CKD and diastolic heart failure.  I advised him for low-salt diet, compression stockings, elevating his  legs.   Echocardiogram with normal LV systolic function, he does have elevated right sided pressures.   PFTS showed some restriction but no obstructive disease.   I recommended a sleep study however patient is not interested at this time.  On as needed Lasix.        Prevention:  Patient's Body mass index is 28.5 kg/m². BMI is above normal parameters. Recommendations include: exercise counseling, none (medical contraindication) and nutrition counseling.      Thaddeus William is not a smoker    AAA Screening:   Not needed            This document has been electronically signed by Oc Cameron MD on October 6, 2021 10:56 CDT

## 2022-01-04 ENCOUNTER — TRANSCRIBE ORDERS (OUTPATIENT)
Dept: LAB | Facility: HOSPITAL | Age: 77
End: 2022-01-04

## 2022-01-04 DIAGNOSIS — N18.32 STAGE 3B CHRONIC KIDNEY DISEASE: Primary | ICD-10-CM

## 2022-01-06 ENCOUNTER — LAB (OUTPATIENT)
Dept: LAB | Facility: HOSPITAL | Age: 77
End: 2022-01-06

## 2022-01-06 DIAGNOSIS — N18.32 STAGE 3B CHRONIC KIDNEY DISEASE: ICD-10-CM

## 2022-01-06 PROCEDURE — 82570 ASSAY OF URINE CREATININE: CPT

## 2022-01-06 PROCEDURE — 80069 RENAL FUNCTION PANEL: CPT

## 2022-01-06 PROCEDURE — 83970 ASSAY OF PARATHORMONE: CPT

## 2022-01-06 PROCEDURE — 36415 COLL VENOUS BLD VENIPUNCTURE: CPT

## 2022-01-06 PROCEDURE — 84156 ASSAY OF PROTEIN URINE: CPT

## 2022-01-07 LAB
ALBUMIN SERPL-MCNC: 4.1 G/DL (ref 3.5–5.2)
ANION GAP SERPL CALCULATED.3IONS-SCNC: 9.8 MMOL/L (ref 5–15)
BUN SERPL-MCNC: 27 MG/DL (ref 8–23)
BUN/CREAT SERPL: 14.1 (ref 7–25)
CALCIUM SPEC-SCNC: 8.7 MG/DL (ref 8.6–10.5)
CHLORIDE SERPL-SCNC: 107 MMOL/L (ref 98–107)
CO2 SERPL-SCNC: 21.2 MMOL/L (ref 22–29)
CREAT SERPL-MCNC: 1.92 MG/DL (ref 0.76–1.27)
CREAT UR-MCNC: 68.2 MG/DL
GFR SERPL CREATININE-BSD FRML MDRD: 34 ML/MIN/1.73
GLUCOSE SERPL-MCNC: 187 MG/DL (ref 65–99)
PHOSPHATE SERPL-MCNC: 3.5 MG/DL (ref 2.5–4.5)
POTASSIUM SERPL-SCNC: 4.3 MMOL/L (ref 3.5–5.2)
PROT ?TM UR-MCNC: 5 MG/DL
PROT/CREAT UR: 73.3 MG/G CREA (ref 0–200)
PTH-INTACT SERPL-MCNC: 28.5 PG/ML (ref 15–65)
SODIUM SERPL-SCNC: 138 MMOL/L (ref 136–145)

## 2022-07-12 ENCOUNTER — TRANSCRIBE ORDERS (OUTPATIENT)
Dept: LAB | Facility: HOSPITAL | Age: 77
End: 2022-07-12

## 2022-07-12 DIAGNOSIS — N18.30 STAGE 3 CHRONIC KIDNEY DISEASE, UNSPECIFIED WHETHER STAGE 3A OR 3B CKD: Primary | ICD-10-CM

## 2022-10-07 ENCOUNTER — OFFICE VISIT (OUTPATIENT)
Dept: CARDIOLOGY | Facility: CLINIC | Age: 77
End: 2022-10-07

## 2022-10-07 VITALS
SYSTOLIC BLOOD PRESSURE: 126 MMHG | OXYGEN SATURATION: 99 % | HEART RATE: 66 BPM | BODY MASS INDEX: 26.14 KG/M2 | HEIGHT: 70 IN | DIASTOLIC BLOOD PRESSURE: 54 MMHG | WEIGHT: 182.6 LBS

## 2022-10-07 DIAGNOSIS — I10 ESSENTIAL HYPERTENSION: Primary | ICD-10-CM

## 2022-10-07 PROCEDURE — 93000 ELECTROCARDIOGRAM COMPLETE: CPT | Performed by: INTERNAL MEDICINE

## 2022-10-07 PROCEDURE — 99214 OFFICE O/P EST MOD 30 MIN: CPT | Performed by: INTERNAL MEDICINE

## 2022-10-07 NOTE — PROGRESS NOTES
[facility]  OFFICE NOTE    Cardiovascular Medicine  Oc Cameron M.D., RPVI         No referring provider defined for this encounter.    Thank you for asking me to see Thaddeus Yaakov William for CAD.    This is a 77 y.o. male with:    1. Coronary artery disease involving native coronary artery of native heart without angina pectoris    2. Pure hypercholesterolemia    3. Essential hypertension    4. Stage 3 chronic kidney disease (CMS/HCC)          Chief complaint -Follow-up CAD        History of present Illness- 77 y.o.-year-old gentleman who is history of coronary disease status post CABG in 2008 with LIMA to LAD, SVG to the PDA and had stent placement to left circumflex artery had a cardiac catheterization June 2016 showed the grafts patent and the native circumflex stents patent.  He has chronic stable angina on multiple medicines and he has chronic kidney disease stage III.  [facility].  He is currently  on aspirin 81 mg daily which he had stopped before because of  prior bleeding into the eye.  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  He does have chronic stable angina, started him on Ranexa 500 twice daily and asked him to take the aspirin every other day    10/07/2022:  No acute issues since last visit.  Occasionally has a sharp/stabbing pain on the left subcostal region.  It is not happening with more frequency or severity.  Nonexertional.  He is pretty active and works in his yard without any limitations from chest pain or shortness of breath on exertion.    12/10/2019:  No acute issues since last visit.  Continues to have this left-sided chest pain, which appears atypical, it is mostly resting, his pain goes away when he is exerting  himself  Also has chronic lower extremity swelling, has used compression stockings with some improvement  Denying orthopnea or PND.  No palpitations      10/06/2020:  No acute issues since last visit.  No evidence of accelerating angina.  Lower extremity swelling is improved.    10/06/2021:  No acute issues since last visit but denying any chest pain or shortness of breath.  Lower extremity swelling is stable.        Review of Systems - ROS  Constitution: Negative for weakness, weight gain and weight loss.   HENT: Negative for congestion.    Eyes: Negative for blurred vision.   Cardiovascular: As mentioned above  Respiratory: Negative for cough and hemoptysis.    Endocrine: Negative for polydipsia and polyuria.   Hematologic/Lymphatic: Negative for bleeding problem. Does not bruise/bleed easily.   Skin: Negative for flushing.   Musculoskeletal: Negative for neck pain and stiffness.   Gastrointestinal: Negative for abdominal pain, diarrhea, jaundice, melena, nausea and vomiting.   Genitourinary: Negative for dysuria and hematuria.   Neurological: Negative for dizziness, focal weakness and numbness.   Psychiatric/Behavioral: Negative for altered mental status and depression.     I reviewed the ROS as documented here and confirmed the accuracy of it with the patient today. 10/7/2022        All other systems were reviewed and were negative.    family history includes Cancer in an other family member; Diabetes in an other family member; Heart disease in an other family member; Hypertension in an other family member; Stroke in an other family member.     reports that he has never smoked. He has never used smokeless tobacco. He reports current alcohol use. He reports that he does not use drugs.    Allergies   Allergen Reactions   • Codeine Hives and Itching         Current Outpatient Medications:   •  acetaZOLAMIDE (DIAMOX) 250 MG tablet, Take 250 mg by mouth 2 (Two) Times a Day. Indication: eye, Disp: , Rfl:   •  aspirin  "81 MG chewable tablet, Chew 81 mg Daily., Disp: , Rfl:   •  atenolol (TENORMIN) 50 MG tablet, Take 50 mg by mouth Daily. Indication: heart, Disp: , Rfl:   •  Cholecalciferol (VITAMIN D PO), Take 1 tablet by mouth Daily. Indication: supplement , Disp: , Rfl:   •  cyanocobalamin (VITAMIN B-12) 1000 MCG tablet, Take 1,000 mcg by mouth Daily. Indication: supplement, Disp: , Rfl:   •  doxazosin (CARDURA) 4 MG tablet, Take 4 mg by mouth every night at bedtime., Disp: , Rfl: 0  •  furosemide (LASIX) 20 MG tablet, Take 20 mg by mouth Every Other Day. Indication: fluid, Disp: , Rfl:   •  insulin glargine (LANTUS, SEMGLEE) 100 UNIT/ML injection, Inject 41 Units under the skin into the appropriate area as directed Daily. (insulin); Med Name: Lantus 100 unit/mL subcutaneous solution; Indication: DIABETES , Disp: , Rfl:   •  Insulin Lispro (HUMALOG KWIKPEN SC), Inject  under the skin into the appropriate area as directed. Sliding scale, Disp: , Rfl:   •  isosorbide mononitrate (IMDUR) 60 MG 24 hr tablet, Take 30 mg by mouth Daily. 1/2 tablet daily, Disp: , Rfl: 0  •  lisinopril (PRINIVIL,ZESTRIL) 20 MG tablet, Take 20 mg by mouth Daily. Indication: bp, Disp: , Rfl:   •  ranolazine (RANEXA) 500 MG 12 hr tablet, Take 1 tablet by mouth 2 (Two) Times a Day. (Patient taking differently: Take 500 mg by mouth Daily.), Disp: 60 tablet, Rfl: 12  •  simvastatin (ZOCOR) 40 MG tablet, Take 1 tablet by mouth. 1/2 tab daily, Disp: , Rfl:   •  vitamin C (ASCORBIC ACID) 500 MG tablet, Take 500 mg by mouth 2 (Two) Times a Day. Indication: supplement, Disp: , Rfl:     Physical Exam:  Vitals:    10/07/22 1003   BP: 126/54   BP Location: Left arm   Patient Position: Sitting   Cuff Size: Adult   Pulse: 66   SpO2: 99%   Weight: 82.8 kg (182 lb 9.6 oz)   Height: 177.8 cm (70\")   PainSc: 0-No pain   PainLoc: Chest     Current Pain Level: none  Pulse Ox: Normal  on room air  General: alert, appears stated age and cooperative     Body Habitus: " well-nourished    HEENT: Head: Normocephalic, no lesions, without obvious abnormality. No arcus senilis, xanthelasma or xanthomas.    Neuro: alert, oriented x3  Pulses: 2+ and symmetric  JVP: Volume/Pulsation: Normal.  Normal waveforms.   Appropriate inspiratory decrease.  No Kussmaul's. No Ronda's.   Carotid Exam: no bruit normal pulsation bilaterally   Carotid Volume: normal.     Respirations: no increased work of breathing   Chest:  Normal    Pulmonary:Normal   Precordium: Normal impulses. P2 is not palpable.  RV Heave: absent  LV Heave: absent  [place]:  normal size and placement  Palpable S4: absent.  Heart rate: normal    Heart Rhythm: regular     Heart Sounds: S1: normal  S2: normal  S3: absent   S4: absent  Opening Snap: absent    Pericardial Rub:  Absent: .    Abdomen:   Appearance: normal .  Palpation: Soft, non-tender to palpation, bowel sounds positive in all four quadrants; no guarding or rebound tenderness  Extremity: +1 pitting edema.  LE Skin: no rashes  LE Hair:  normal  LE Pulses: well perfused with normal pulses in the distal extremities  Pallor on elevation: Absent. Rubor on dependency: None    I have reexamined the patient and the results are consistent with the previously documented exam. Oc Cameron MD       DATA REVIEWED:       ECG/EMG Results (all)     None        ---------------------------------------------------  TTE/JOSE M:  Results for orders placed in visit on 12/10/19    Adult Transthoracic Echo Complete W/ Cont if Necessary Per Protocol    Interpretation Summary  · Estimated EF appears to be in the range of 51 - 55%.  · Left ventricular systolic function is normal.  · Left ventricular wall thickness is consistent with mild concentric hypertrophy.  · Mild tricuspid valve regurgitation is present.  · Estimated right ventricular systolic pressure from tricuspid regurgitation is markedly elevated (>55 mmHg). Moderate to severe pulmonary hypertension is present.  · Left ventricular  diastolic function is normal.    --------------------------------------------------------------------------------------------------  LABS:     The CVD Risk score (Jose C et al., 2008) failed to calculate for the following reasons:    The 2008 CVD risk score is only valid for ages 30 to 74    The patient has a prior MI, stroke, CHF, or peripheral vascular disease diagnosis         Lab Results   Component Value Date    GLUCOSE 187 (H) 01/06/2022    BUN 27 (H) 01/06/2022    CREATININE 1.92 (H) 01/06/2022    EGFRIFNONA 34 (L) 01/06/2022    BCR 14.1 01/06/2022    K 4.3 01/06/2022    CO2 21.2 (L) 01/06/2022    CALCIUM 8.7 01/06/2022    ALBUMIN 4.10 01/06/2022    ALT 23 05/19/2014     Lab Results   Component Value Date    WBC 7.8 04/04/2022    HGB 11.8 (L) 04/04/2022    HCT 37.2 (L) 04/04/2022    .3 (H) 04/04/2022     (L) 04/04/2022     Lab Results   Component Value Date    CHLPL 102 04/04/2022    TRIG 56 04/04/2022    HDL 38 (L) 04/04/2022    LDL 53 04/04/2022     No results found for: TSH, I1SRTVN, B4CFBAL, THYROIDAB  No results found for: CKTOTAL, CKMB, CKMBINDEX, TROPONINI, TROPONINT  Lab Results   Component Value Date    HGBA1C 7.3 (H) 05/19/2014     No results found for: DDIMER  Lab Results   Component Value Date    ALT 23 05/19/2014     Lab Results   Component Value Date    HGBA1C 7.3 (H) 05/19/2014     Lab Results   Component Value Date    CREATININE 1.92 (H) 01/06/2022     No results found for: IRON, TIBC, FERRITIN  Lab Results   Component Value Date    INR 1.2 06/08/2016    PROTIME 15.0 06/08/2016       Assessment/Plan     1. Chronic stable angina with history of CABG, last cardiac catheterization June 2016 grafts patent and stent to the circumflex patent,  Has chronic stable angina.  He has been on nitrates and beta blockers and Ace inhibitors.  Started him on aspirin every other day since he was taking it every day with his prior history of bleeding into the eye, and  Ranexa 500 twice  daily  Chronic atypical left-sided chest pain which is unchanged from before.  We did talk about stress test, however in setting of CKD and signs and symptoms of NSTEMI he wants to hold off for now.    2. Hyperlipidemia on niacin and simvastatin.  LDL is 53 at target.    3. Diabetes on insulin.     4. Chronic kidney disease stage -3 table and being followed with Dr. Greenberg    5. Hypertension-on atenolol, lisinopril, Imdur.    6.  Lower extremity swelling: Improved  Chronic, is edema CKD and diastolic heart failure.  I advised him for low-salt diet, compression stockings, elevating his legs.   Echocardiogram with normal LV systolic function, he does have elevated right sided pressures.   PFTS showed some restriction but no obstructive disease.   I recommended a sleep study however patient is not interested at this time.  On as needed Lasix.        Prevention:  Patient's Body mass index is 26.2 kg/m². BMI is above normal parameters. Recommendations include: exercise counseling, none (medical contraindication) and nutrition counseling.      Thaddeus William is not a smoker    AAA Screening:   Not needed            This document has been electronically signed by Oc Cameron MD on October 7, 2022 10:09 CDT

## 2022-10-16 LAB
QT INTERVAL: 422 MS
QTC INTERVAL: 442 MS

## 2023-01-10 ENCOUNTER — TRANSCRIBE ORDERS (OUTPATIENT)
Dept: LAB | Facility: HOSPITAL | Age: 78
End: 2023-01-10
Payer: MEDICARE

## 2023-01-10 DIAGNOSIS — N18.32 CHRONIC KIDNEY DISEASE (CKD) STAGE G3B/A1, MODERATELY DECREASED GLOMERULAR FILTRATION RATE (GFR) BETWEEN 30-44 ML/MIN/1.73 SQUARE METER AND ALBUMINURIA CREATININE RATIO LESS THAN 30 MG/G (CMS/H*: Primary | ICD-10-CM

## 2023-01-12 ENCOUNTER — LAB (OUTPATIENT)
Dept: LAB | Facility: HOSPITAL | Age: 78
End: 2023-01-12
Payer: MEDICARE

## 2023-01-12 DIAGNOSIS — N18.30 STAGE 3 CHRONIC KIDNEY DISEASE, UNSPECIFIED WHETHER STAGE 3A OR 3B CKD: ICD-10-CM

## 2023-01-12 LAB
25(OH)D3 SERPL-MCNC: 36.1 NG/ML (ref 30–100)
ALBUMIN SERPL-MCNC: 3.7 G/DL (ref 3.5–5.2)
ANION GAP SERPL CALCULATED.3IONS-SCNC: 9.4 MMOL/L (ref 5–15)
BUN SERPL-MCNC: 33 MG/DL (ref 8–23)
BUN/CREAT SERPL: 12.8 (ref 7–25)
CALCIUM SPEC-SCNC: 9.1 MG/DL (ref 8.6–10.5)
CHLORIDE SERPL-SCNC: 108 MMOL/L (ref 98–107)
CO2 SERPL-SCNC: 19.6 MMOL/L (ref 22–29)
CREAT SERPL-MCNC: 2.58 MG/DL (ref 0.76–1.27)
EGFRCR SERPLBLD CKD-EPI 2021: 24.7 ML/MIN/1.73
GLUCOSE SERPL-MCNC: 96 MG/DL (ref 65–99)
PHOSPHATE SERPL-MCNC: 4.1 MG/DL (ref 2.5–4.5)
POTASSIUM SERPL-SCNC: 4 MMOL/L (ref 3.5–5.2)
SODIUM SERPL-SCNC: 137 MMOL/L (ref 136–145)

## 2023-01-12 PROCEDURE — 82306 VITAMIN D 25 HYDROXY: CPT | Performed by: INTERNAL MEDICINE

## 2023-01-12 PROCEDURE — 84156 ASSAY OF PROTEIN URINE: CPT | Performed by: INTERNAL MEDICINE

## 2023-01-12 PROCEDURE — 80069 RENAL FUNCTION PANEL: CPT | Performed by: INTERNAL MEDICINE

## 2023-01-12 PROCEDURE — 82570 ASSAY OF URINE CREATININE: CPT | Performed by: INTERNAL MEDICINE

## 2023-01-12 PROCEDURE — 36415 COLL VENOUS BLD VENIPUNCTURE: CPT | Performed by: INTERNAL MEDICINE

## 2023-01-13 LAB
CREAT UR-MCNC: 88.5 MG/DL
PROT ?TM UR-MCNC: 10 MG/DL
PROT/CREAT UR: 113 MG/G CREA (ref 0–200)

## 2023-02-15 ENCOUNTER — TRANSCRIBE ORDERS (OUTPATIENT)
Dept: LAB | Facility: HOSPITAL | Age: 78
End: 2023-02-15
Payer: MEDICARE

## 2023-02-15 DIAGNOSIS — N17.9 ACUTE RENAL FAILURE, UNSPECIFIED ACUTE RENAL FAILURE TYPE: Primary | ICD-10-CM

## 2023-02-16 ENCOUNTER — LAB (OUTPATIENT)
Dept: LAB | Facility: HOSPITAL | Age: 78
End: 2023-02-16
Payer: MEDICARE

## 2023-02-16 LAB
ALBUMIN SERPL-MCNC: 3.9 G/DL (ref 3.5–5.2)
ANION GAP SERPL CALCULATED.3IONS-SCNC: 8 MMOL/L (ref 5–15)
BASOPHILS # BLD AUTO: 0.03 10*3/MM3 (ref 0–0.2)
BASOPHILS NFR BLD AUTO: 0.4 % (ref 0–1.5)
BUN SERPL-MCNC: 35 MG/DL (ref 8–23)
BUN/CREAT SERPL: 15.1 (ref 7–25)
CALCIUM SPEC-SCNC: 8.7 MG/DL (ref 8.6–10.5)
CHLORIDE SERPL-SCNC: 110 MMOL/L (ref 98–107)
CK SERPL-CCNC: 121 U/L (ref 20–200)
CO2 SERPL-SCNC: 21 MMOL/L (ref 22–29)
CREAT SERPL-MCNC: 2.32 MG/DL (ref 0.76–1.27)
CREAT UR-MCNC: 94.2 MG/DL
DEPRECATED RDW RBC AUTO: 42.6 FL (ref 37–54)
EGFRCR SERPLBLD CKD-EPI 2021: 28.1 ML/MIN/1.73
EOSINOPHIL # BLD AUTO: 0.19 10*3/MM3 (ref 0–0.4)
EOSINOPHIL NFR BLD AUTO: 2.8 % (ref 0.3–6.2)
ERYTHROCYTE [DISTWIDTH] IN BLOOD BY AUTOMATED COUNT: 12.3 % (ref 12.3–15.4)
GLUCOSE SERPL-MCNC: 90 MG/DL (ref 65–99)
HCT VFR BLD AUTO: 34.7 % (ref 37.5–51)
HGB BLD-MCNC: 11.7 G/DL (ref 13–17.7)
IMM GRANULOCYTES # BLD AUTO: 0.01 10*3/MM3 (ref 0–0.05)
IMM GRANULOCYTES NFR BLD AUTO: 0.1 % (ref 0–0.5)
LYMPHOCYTES # BLD AUTO: 1.69 10*3/MM3 (ref 0.7–3.1)
LYMPHOCYTES NFR BLD AUTO: 24.7 % (ref 19.6–45.3)
MCH RBC QN AUTO: 32 PG (ref 26.6–33)
MCHC RBC AUTO-ENTMCNC: 33.7 G/DL (ref 31.5–35.7)
MCV RBC AUTO: 94.8 FL (ref 79–97)
MONOCYTES # BLD AUTO: 0.68 10*3/MM3 (ref 0.1–0.9)
MONOCYTES NFR BLD AUTO: 9.9 % (ref 5–12)
NEUTROPHILS NFR BLD AUTO: 4.24 10*3/MM3 (ref 1.7–7)
NEUTROPHILS NFR BLD AUTO: 62.1 % (ref 42.7–76)
NRBC BLD AUTO-RTO: 0 /100 WBC (ref 0–0.2)
PHOSPHATE SERPL-MCNC: 3.8 MG/DL (ref 2.5–4.5)
PLATELET # BLD AUTO: 142 10*3/MM3 (ref 140–450)
PMV BLD AUTO: 11.9 FL (ref 6–12)
POTASSIUM SERPL-SCNC: 4 MMOL/L (ref 3.5–5.2)
PROT ?TM UR-MCNC: 8.5 MG/DL
PROT/CREAT UR: 90.2 MG/G CREA (ref 0–200)
RBC # BLD AUTO: 3.66 10*6/MM3 (ref 4.14–5.8)
SODIUM SERPL-SCNC: 139 MMOL/L (ref 136–145)
WBC NRBC COR # BLD: 6.84 10*3/MM3 (ref 3.4–10.8)

## 2023-02-16 PROCEDURE — 85025 COMPLETE CBC W/AUTO DIFF WBC: CPT | Performed by: INTERNAL MEDICINE

## 2023-02-16 PROCEDURE — 36415 COLL VENOUS BLD VENIPUNCTURE: CPT | Performed by: INTERNAL MEDICINE

## 2023-02-16 PROCEDURE — 82550 ASSAY OF CK (CPK): CPT | Performed by: INTERNAL MEDICINE

## 2023-02-16 PROCEDURE — 82570 ASSAY OF URINE CREATININE: CPT | Performed by: INTERNAL MEDICINE

## 2023-02-16 PROCEDURE — 84156 ASSAY OF PROTEIN URINE: CPT | Performed by: INTERNAL MEDICINE

## 2023-02-16 PROCEDURE — 80069 RENAL FUNCTION PANEL: CPT | Performed by: INTERNAL MEDICINE

## 2023-02-21 ENCOUNTER — TRANSCRIBE ORDERS (OUTPATIENT)
Dept: LAB | Facility: HOSPITAL | Age: 78
End: 2023-02-21
Payer: MEDICARE

## 2023-02-21 DIAGNOSIS — N17.9 ACUTE RENAL FAILURE, UNSPECIFIED ACUTE RENAL FAILURE TYPE: Primary | ICD-10-CM

## 2023-03-08 ENCOUNTER — HOSPITAL ENCOUNTER (OUTPATIENT)
Dept: ULTRASOUND IMAGING | Facility: HOSPITAL | Age: 78
Discharge: HOME | End: 2023-03-08
Payer: MEDICARE

## 2023-03-08 DIAGNOSIS — N17.9 ACUTE RENAL FAILURE, UNSPECIFIED ACUTE RENAL FAILURE TYPE: ICD-10-CM

## 2023-03-08 PROCEDURE — 76775 US EXAM ABDO BACK WALL LIM: CPT

## 2023-03-08 PROCEDURE — 76857 US EXAM PELVIC LIMITED: CPT

## 2023-04-12 ENCOUNTER — OFFICE VISIT (OUTPATIENT)
Dept: CARDIOLOGY | Facility: CLINIC | Age: 78
End: 2023-04-12
Payer: MEDICARE

## 2023-04-12 VITALS
SYSTOLIC BLOOD PRESSURE: 146 MMHG | HEIGHT: 70 IN | DIASTOLIC BLOOD PRESSURE: 60 MMHG | HEART RATE: 52 BPM | BODY MASS INDEX: 26.63 KG/M2 | OXYGEN SATURATION: 98 % | WEIGHT: 186 LBS

## 2023-04-12 DIAGNOSIS — R07.9 CHEST PAIN, UNSPECIFIED TYPE: Primary | ICD-10-CM

## 2023-04-12 PROCEDURE — 3078F DIAST BP <80 MM HG: CPT | Performed by: INTERNAL MEDICINE

## 2023-04-12 PROCEDURE — 1160F RVW MEDS BY RX/DR IN RCRD: CPT | Performed by: INTERNAL MEDICINE

## 2023-04-12 PROCEDURE — 99214 OFFICE O/P EST MOD 30 MIN: CPT | Performed by: INTERNAL MEDICINE

## 2023-04-12 PROCEDURE — 93000 ELECTROCARDIOGRAM COMPLETE: CPT | Performed by: INTERNAL MEDICINE

## 2023-04-12 PROCEDURE — 1159F MED LIST DOCD IN RCRD: CPT | Performed by: INTERNAL MEDICINE

## 2023-04-12 PROCEDURE — 3077F SYST BP >= 140 MM HG: CPT | Performed by: INTERNAL MEDICINE

## 2023-04-12 RX ORDER — NITROGLYCERIN 0.4 MG/1
0.4 TABLET SUBLINGUAL
COMMUNITY
End: 2023-04-12 | Stop reason: SDUPTHER

## 2023-04-12 RX ORDER — NITROGLYCERIN 0.4 MG/1
0.4 TABLET SUBLINGUAL
Qty: 30 TABLET | Refills: 3 | Status: SHIPPED | OUTPATIENT
Start: 2023-04-12

## 2023-04-12 NOTE — PROGRESS NOTES
[facility]  OFFICE NOTE    Cardiovascular Medicine  Oc Cameron M.D., RPVI         No referring provider defined for this encounter.    Thank you for asking me to see Thaddeus Yaakov William for CAD.    This is a 77 y.o. male with:    1. Coronary artery disease involving native coronary artery of native heart without angina pectoris    2. Pure hypercholesterolemia    3. Essential hypertension    4. Stage 3 chronic kidney disease (CMS/[facility])          Chief complaint -Follow-up CAD        History of present Illness- 77 y.o.-year-old gentleman who is history of coronary disease status post CABG in 2008 with LIMA to LAD, SVG to the PDA and had stent placement to left circumflex artery had a cardiac catheterization June 2016 showed the grafts patent and the native circumflex stents patent.  He has chronic stable angina on multiple medicines and he has chronic kidney disease stage III.  [facility].  He is currently  on aspirin 81 mg daily which he had stopped before because of  prior bleeding into the eye.  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  He does have chronic stable angina, started him on Ranexa 500 twice daily and asked him to take the aspirin every other day    04/12/2023:  Reported intermittent left subcostal bandlike pain lasting for a few minutes, mostly nonexertional.  Pretty active at baseline denies any chest pain with exertion.  He does not use nitroglycerin.    10/07/2022:  No acute issues since last visit.  Occasionally has a sharp/stabbing pain on the left subcostal region.  It is not happening with more frequency or severity.  Nonexertional.  He is pretty active and works in his yard without any limitations from chest pain or  shortness of breath on exertion.    12/10/2019:  No acute issues since last visit.  Continues to have this left-sided chest pain, which appears atypical, it is mostly resting, his pain goes away when he is exerting himself  Also has chronic lower extremity swelling, has used compression stockings with some improvement  Denying orthopnea or PND.  No palpitations      10/06/2020:  No acute issues since last visit.  No evidence of accelerating angina.  Lower extremity swelling is improved.    10/06/2021:  No acute issues since last visit but denying any chest pain or shortness of breath.  Lower extremity swelling is stable.        Review of Systems - ROS  Constitution: Negative for weakness, weight gain and weight loss.   HENT: Negative for congestion.    Eyes: Negative for blurred vision.   Cardiovascular: As mentioned above  Respiratory: Negative for cough and hemoptysis.    Endocrine: Negative for polydipsia and polyuria.   Hematologic/Lymphatic: Negative for bleeding problem. Does not bruise/bleed easily.   Skin: Negative for flushing.   Musculoskeletal: Negative for neck pain and stiffness.   Gastrointestinal: Negative for abdominal pain, diarrhea, jaundice, melena, nausea and vomiting.   Genitourinary: Negative for dysuria and hematuria.   Neurological: Negative for dizziness, focal weakness and numbness.   Psychiatric/Behavioral: Negative for altered mental status and depression.     I reviewed the ROS as documented here and confirmed the accuracy of it with the patient today. 4/12/2023        All other systems were reviewed and were negative.    family history includes Cancer in an other family member; Diabetes in an other family member; Heart disease in an other family member; Hypertension in an other family member; Stroke in an other family member.     reports that he has never smoked. He has never used smokeless tobacco. He reports current alcohol use. He reports that he does not use drugs.    Allergies    Allergen Reactions   • Codeine Hives and Itching         Current Outpatient Medications:   •  acetaZOLAMIDE (DIAMOX) 250 MG tablet, Take 1 tablet by mouth 2 (Two) Times a Day. Indication: eye, Disp: , Rfl:   •  aspirin 81 MG chewable tablet, Chew 1 tablet Daily., Disp: , Rfl:   •  atenolol (TENORMIN) 50 MG tablet, Take 1 tablet by mouth Daily. Indication: heart, Disp: , Rfl:   •  Cholecalciferol (VITAMIN D PO), Take 1 tablet by mouth Daily. Indication: supplement , Disp: , Rfl:   •  cyanocobalamin (VITAMIN B-12) 1000 MCG tablet, Take 1 tablet by mouth Daily. Indication: supplement, Disp: , Rfl:   •  doxazosin (CARDURA) 4 MG tablet, Take 1 tablet by mouth every night at bedtime., Disp: , Rfl: 0  •  furosemide (LASIX) 20 MG tablet, Take 1 tablet by mouth Every Other Day. Indication: fluid, Disp: , Rfl:   •  insulin glargine (LANTUS, SEMGLEE) 100 UNIT/ML injection, Inject 41 Units under the skin into the appropriate area as directed Daily. (insulin); Med Name: Lantus 100 unit/mL subcutaneous solution; Indication: DIABETES, Disp: , Rfl:   •  Insulin Lispro (HUMALOG KWIKPEN SC), Inject  under the skin into the appropriate area as directed. Sliding scale, Disp: , Rfl:   •  isosorbide mononitrate (IMDUR) 60 MG 24 hr tablet, Take 30 mg by mouth Daily. 1/2 tablet daily, Disp: , Rfl: 0  •  lisinopril (PRINIVIL,ZESTRIL) 20 MG tablet, Take 1 tablet by mouth Daily. Indication: bp, Disp: , Rfl:   •  nitroglycerin (NITROSTAT) 0.4 MG SL tablet, Place 1 tablet under the tongue., Disp: , Rfl:   •  ranolazine (RANEXA) 500 MG 12 hr tablet, Take 1 tablet by mouth 2 (Two) Times a Day. (Patient taking differently: Take 1 tablet by mouth Daily.), Disp: 60 tablet, Rfl: 12  •  simvastatin (ZOCOR) 40 MG tablet, Take 1 tablet by mouth. 1/2 tab daily, Disp: , Rfl:   •  vitamin C (ASCORBIC ACID) 500 MG tablet, Take 1 tablet by mouth 2 (Two) Times a Day. Indication: supplement, Disp: , Rfl:     Physical Exam:  Vitals:    04/12/23 1020   BP:  "146/60   BP Location: Left arm   Patient Position: Sitting   Cuff Size: Adult   Pulse: 52   SpO2: 98%   Weight: 84.4 kg (186 lb)   Height: 177.8 cm (70\")   PainSc: 0-No pain   PainLoc: Chest     Current Pain Level: none  Pulse Ox: Normal  on room air  General: alert, appears stated age and cooperative     Body Habitus: well-nourished    HEENT: Head: Normocephalic, no lesions, without obvious abnormality. No arcus senilis, xanthelasma or xanthomas.    Neuro: alert, oriented x3  Pulses: 2+ and symmetric  JVP: Volume/Pulsation: Normal.  Normal waveforms.   Appropriate inspiratory decrease.  No Kussmaul's. No Ronda's.   Carotid Exam: no bruit normal pulsation bilaterally   Carotid Volume: normal.     Respirations: no increased work of breathing   Chest:  Normal    Pulmonary:Normal   Precordium: Normal impulses. P2 is not palpable.  RV Heave: absent  LV Heave: absent  [place]:  normal size and placement  Palpable S4: absent.  Heart rate: normal    Heart Rhythm: regular     Heart Sounds: S1: normal  S2: normal  S3: absent   S4: absent  Opening Snap: absent    Pericardial Rub:  Absent: .    Abdomen:   Appearance: normal .  Palpation: Soft, non-tender to palpation, bowel sounds positive in all four quadrants; no guarding or rebound tenderness  Extremity: +1 pitting edema.  LE Skin: no rashes  LE Hair:  normal  LE Pulses: well perfused with normal pulses in the distal extremities  Pallor on elevation: Absent. Rubor on dependency: None    I have reexamined the patient and the results are consistent with the previously documented exam. Oc Cameron MD       DATA REVIEWED:       ECG/EMG Results (all)     None        ---------------------------------------------------  TTE/JOSE M:  Results for orders placed in visit on 12/10/19    Adult Transthoracic Echo Complete W/ Cont if Necessary Per Protocol    Interpretation Summary  · Estimated EF appears to be in the range of 51 - 55%.  · Left ventricular systolic function is normal.  · " Left ventricular wall thickness is consistent with mild concentric hypertrophy.  · Mild tricuspid valve regurgitation is present.  · Estimated right ventricular systolic pressure from tricuspid regurgitation is markedly elevated (>55 mmHg). Moderate to severe pulmonary hypertension is present.  · Left ventricular diastolic function is normal.    --------------------------------------------------------------------------------------------------  LABS:     The CVD Risk score (Jose C, et al., 2008) failed to calculate for the following reasons:    The 2008 CVD risk score is only valid for ages 30 to 74    The patient has a prior MI, stroke, CHF, or peripheral vascular disease diagnosis         Lab Results   Component Value Date    GLUCOSE 90 02/16/2023    BUN 35 (H) 02/16/2023    CREATININE 2.32 (H) 02/16/2023    EGFRIFNONA 34 (L) 01/06/2022    BCR 15.1 02/16/2023    K 4.0 02/16/2023    CO2 21.0 (L) 02/16/2023    CALCIUM 8.7 02/16/2023    ALBUMIN 3.9 02/16/2023    ALT 23 05/19/2014     Lab Results   Component Value Date    WBC 6.84 02/16/2023    HGB 11.7 (L) 02/16/2023    HCT 34.7 (L) 02/16/2023    MCV 94.8 02/16/2023     02/16/2023     Lab Results   Component Value Date    CHLPL 102 04/04/2022    TRIG 56 04/04/2022    HDL 38 (L) 04/04/2022    LDL 53 04/04/2022     No results found for: TSH, P9VDOZK, P2EMMIH, THYROIDAB  Lab Results   Component Value Date    CKTOTAL 121 02/16/2023     Lab Results   Component Value Date    HGBA1C 5.8 12/07/2022     No results found for: DDIMER  Lab Results   Component Value Date    ALT 23 05/19/2014     Lab Results   Component Value Date    HGBA1C 5.8 12/07/2022    HGBA1C 7.3 (H) 05/19/2014     Lab Results   Component Value Date    CREATININE 2.32 (H) 02/16/2023     No results found for: IRON, TIBC, FERRITIN  Lab Results   Component Value Date    INR 1.2 06/08/2016    PROTIME 15.0 06/08/2016       Assessment/Plan     1. Chronic stable angina with history of CABG, last cardiac  catheterization June 2016 grafts patent and stent to the circumflex patent,  Has chronic stable angina.  He has been on nitrates and beta blockers and Ace inhibitors.  Started him on aspirin every other day since he was taking it every day with his prior history of bleeding into the eye, and  Ranexa 500 twice daily  Chronic atypical left-sided chest pain which is unchanged from before.  We did talk about stress test, I explained risk, benefits, alternatives limitation of nuclear stress test with the patient.  However in setting of CKD he wants to hold off for now.  He will call us if he were to have worsening chest pain and wants to schedule a stress test.    2. Hyperlipidemia on niacin and simvastatin.  LDL is 53 at target.    3. Diabetes on insulin.     4. Chronic kidney disease stage -3 table and being followed with Dr. Greenberg    5. Hypertension-on atenolol, lisinopril, Imdur.    6.  Lower extremity swelling: Improved  Chronic, is edema CKD and diastolic heart failure.  I advised him for low-salt diet, compression stockings, elevating his legs.   Echocardiogram with normal LV systolic function, he does have elevated right sided pressures.   PFTS showed some restriction but no obstructive disease.   I recommended a sleep study however patient is not interested at this time.  On as needed Lasix.        Prevention:  Patient's Body mass index is 26.69 kg/m². BMI is above normal parameters. Recommendations include: exercise counseling, none (medical contraindication) and nutrition counseling.      Thaddeus William is not a smoker    AAA Screening:   Not needed            This document has been electronically signed by Oc Cameron MD on April 12, 2023 10:31 CDT

## 2023-04-13 LAB
QT INTERVAL: 450 MS
QTC INTERVAL: 418 MS

## 2023-05-19 ENCOUNTER — LAB (OUTPATIENT)
Dept: LAB | Facility: HOSPITAL | Age: 78
End: 2023-05-19
Payer: MEDICARE

## 2023-05-19 DIAGNOSIS — N17.9 ACUTE RENAL FAILURE, UNSPECIFIED ACUTE RENAL FAILURE TYPE: ICD-10-CM

## 2023-05-19 LAB
BURR CELLS BLD QL SMEAR: ABNORMAL
CK SERPL-CCNC: 162 U/L (ref 20–200)
DEPRECATED RDW RBC AUTO: 40.9 FL (ref 37–54)
EOSINOPHIL # BLD MANUAL: 0.23 10*3/MM3 (ref 0–0.4)
EOSINOPHIL NFR BLD MANUAL: 3.2 % (ref 0.3–6.2)
ERYTHROCYTE [DISTWIDTH] IN BLOOD BY AUTOMATED COUNT: 11.6 % (ref 12.3–15.4)
HCT VFR BLD AUTO: 35.4 % (ref 37.5–51)
HGB BLD-MCNC: 11.9 G/DL (ref 13–17.7)
LYMPHOCYTES # BLD MANUAL: 1.59 10*3/MM3 (ref 0.7–3.1)
LYMPHOCYTES NFR BLD MANUAL: 3.2 % (ref 5–12)
MCH RBC QN AUTO: 32 PG (ref 26.6–33)
MCHC RBC AUTO-ENTMCNC: 33.6 G/DL (ref 31.5–35.7)
MCV RBC AUTO: 95.2 FL (ref 79–97)
MONOCYTES # BLD: 0.23 10*3/MM3 (ref 0.1–0.9)
NEUTROPHILS # BLD AUTO: 5 10*3/MM3 (ref 1.7–7)
NEUTROPHILS NFR BLD MANUAL: 71 % (ref 42.7–76)
PLAT MORPH BLD: NORMAL
PLATELET # BLD AUTO: 146 10*3/MM3 (ref 140–450)
PMV BLD AUTO: 11.6 FL (ref 6–12)
POIKILOCYTOSIS BLD QL SMEAR: ABNORMAL
RBC # BLD AUTO: 3.72 10*6/MM3 (ref 4.14–5.8)
SMUDGE CELLS BLD QL SMEAR: ABNORMAL
VARIANT LYMPHS NFR BLD MANUAL: 22.6 % (ref 19.6–45.3)
WBC NRBC COR # BLD: 7.04 10*3/MM3 (ref 3.4–10.8)

## 2023-05-19 PROCEDURE — 85007 BL SMEAR W/DIFF WBC COUNT: CPT

## 2023-05-19 PROCEDURE — 85027 COMPLETE CBC AUTOMATED: CPT

## 2023-05-19 PROCEDURE — 82550 ASSAY OF CK (CPK): CPT

## 2023-05-19 PROCEDURE — 36415 COLL VENOUS BLD VENIPUNCTURE: CPT

## 2023-05-24 ENCOUNTER — TRANSCRIBE ORDERS (OUTPATIENT)
Dept: LAB | Facility: HOSPITAL | Age: 78
End: 2023-05-24
Payer: MEDICARE

## 2023-05-24 DIAGNOSIS — N17.9 ACUTE RENAL FAILURE, UNSPECIFIED ACUTE RENAL FAILURE TYPE: Primary | ICD-10-CM

## 2023-09-18 ENCOUNTER — TRANSCRIBE ORDERS (OUTPATIENT)
Dept: LAB | Facility: HOSPITAL | Age: 78
End: 2023-09-18
Payer: MEDICARE

## 2023-09-18 ENCOUNTER — LAB (OUTPATIENT)
Dept: LAB | Facility: HOSPITAL | Age: 78
End: 2023-09-18
Payer: MEDICARE

## 2023-09-18 DIAGNOSIS — N17.9 ACUTE RENAL FAILURE, UNSPECIFIED ACUTE RENAL FAILURE TYPE: ICD-10-CM

## 2023-09-18 DIAGNOSIS — N18.32 CHRONIC KIDNEY DISEASE (CKD) STAGE G3B/A1, MODERATELY DECREASED GLOMERULAR FILTRATION RATE (GFR) BETWEEN 30-44 ML/MIN/1.73 SQUARE METER AND ALBUMINURIA CREATININE RATIO LESS THAN 30 MG/G (CMS/H*: Primary | ICD-10-CM

## 2023-09-18 DIAGNOSIS — N18.32 CHRONIC KIDNEY DISEASE (CKD) STAGE G3B/A1, MODERATELY DECREASED GLOMERULAR FILTRATION RATE (GFR) BETWEEN 30-44 ML/MIN/1.73 SQUARE METER AND ALBUMINURIA CREATININE RATIO LESS THAN 30 MG/G (CMS/H*: ICD-10-CM

## 2023-09-18 PROCEDURE — 36415 COLL VENOUS BLD VENIPUNCTURE: CPT

## 2023-09-18 PROCEDURE — 84156 ASSAY OF PROTEIN URINE: CPT

## 2023-09-18 PROCEDURE — 85025 COMPLETE CBC W/AUTO DIFF WBC: CPT

## 2023-09-18 PROCEDURE — 82570 ASSAY OF URINE CREATININE: CPT

## 2023-09-18 PROCEDURE — 83970 ASSAY OF PARATHORMONE: CPT

## 2023-09-18 PROCEDURE — 80069 RENAL FUNCTION PANEL: CPT

## 2023-09-19 LAB
ALBUMIN SERPL-MCNC: 3.8 G/DL (ref 3.5–5.2)
ANION GAP SERPL CALCULATED.3IONS-SCNC: 10.9 MMOL/L (ref 5–15)
BASOPHILS # BLD AUTO: 0.02 10*3/MM3 (ref 0–0.2)
BASOPHILS NFR BLD AUTO: 0.3 % (ref 0–1.5)
BUN SERPL-MCNC: 29 MG/DL (ref 8–23)
BUN/CREAT SERPL: 12.1 (ref 7–25)
CALCIUM SPEC-SCNC: 9.1 MG/DL (ref 8.6–10.5)
CHLORIDE SERPL-SCNC: 108 MMOL/L (ref 98–107)
CO2 SERPL-SCNC: 20.1 MMOL/L (ref 22–29)
CREAT SERPL-MCNC: 2.4 MG/DL (ref 0.76–1.27)
CREAT UR-MCNC: 85.7 MG/DL
DEPRECATED RDW RBC AUTO: 43.3 FL (ref 37–54)
EGFRCR SERPLBLD CKD-EPI 2021: 26.8 ML/MIN/1.73
EOSINOPHIL # BLD AUTO: 0.1 10*3/MM3 (ref 0–0.4)
EOSINOPHIL NFR BLD AUTO: 1.6 % (ref 0.3–6.2)
ERYTHROCYTE [DISTWIDTH] IN BLOOD BY AUTOMATED COUNT: 12.3 % (ref 12.3–15.4)
GLUCOSE SERPL-MCNC: 86 MG/DL (ref 65–99)
HCT VFR BLD AUTO: 33.5 % (ref 37.5–51)
HGB BLD-MCNC: 11.2 G/DL (ref 13–17.7)
IMM GRANULOCYTES # BLD AUTO: 0.02 10*3/MM3 (ref 0–0.05)
IMM GRANULOCYTES NFR BLD AUTO: 0.3 % (ref 0–0.5)
LYMPHOCYTES # BLD AUTO: 1.11 10*3/MM3 (ref 0.7–3.1)
LYMPHOCYTES NFR BLD AUTO: 18 % (ref 19.6–45.3)
MCH RBC QN AUTO: 32.1 PG (ref 26.6–33)
MCHC RBC AUTO-ENTMCNC: 33.4 G/DL (ref 31.5–35.7)
MCV RBC AUTO: 96 FL (ref 79–97)
MONOCYTES # BLD AUTO: 0.67 10*3/MM3 (ref 0.1–0.9)
MONOCYTES NFR BLD AUTO: 10.9 % (ref 5–12)
NEUTROPHILS NFR BLD AUTO: 4.25 10*3/MM3 (ref 1.7–7)
NEUTROPHILS NFR BLD AUTO: 68.9 % (ref 42.7–76)
NRBC BLD AUTO-RTO: 0 /100 WBC (ref 0–0.2)
PHOSPHATE SERPL-MCNC: 3.5 MG/DL (ref 2.5–4.5)
PLATELET # BLD AUTO: 132 10*3/MM3 (ref 140–450)
PMV BLD AUTO: 12.2 FL (ref 6–12)
POTASSIUM SERPL-SCNC: 4.2 MMOL/L (ref 3.5–5.2)
PROT ?TM UR-MCNC: 8.5 MG/DL
PROT/CREAT UR: 99.2 MG/G CREA (ref 0–200)
PTH-INTACT SERPL-MCNC: 21.7 PG/ML (ref 15–65)
RBC # BLD AUTO: 3.49 10*6/MM3 (ref 4.14–5.8)
SODIUM SERPL-SCNC: 139 MMOL/L (ref 136–145)
WBC NRBC COR # BLD: 6.17 10*3/MM3 (ref 3.4–10.8)